# Patient Record
Sex: FEMALE | Race: WHITE | NOT HISPANIC OR LATINO | Employment: OTHER | URBAN - METROPOLITAN AREA
[De-identification: names, ages, dates, MRNs, and addresses within clinical notes are randomized per-mention and may not be internally consistent; named-entity substitution may affect disease eponyms.]

---

## 2019-04-05 ENCOUNTER — HISTORICAL (OUTPATIENT)
Dept: ADMINISTRATIVE | Facility: HOSPITAL | Age: 59
End: 2019-04-05

## 2019-12-12 ENCOUNTER — HISTORICAL (OUTPATIENT)
Dept: ADMINISTRATIVE | Facility: HOSPITAL | Age: 59
End: 2019-12-12

## 2021-08-12 LAB
HUMAN PAPILLOMAVIRUS (HPV): NORMAL
HUMAN PAPILLOMAVIRUS (HPV): NORMAL
PAP RECOMMENDATION EXT: NORMAL
PAP RECOMMENDATION EXT: NORMAL
PAP SMEAR: NORMAL
PAP SMEAR: NORMAL

## 2021-11-08 ENCOUNTER — HISTORICAL (OUTPATIENT)
Dept: ADMINISTRATIVE | Facility: HOSPITAL | Age: 61
End: 2021-11-08

## 2021-11-29 ENCOUNTER — HISTORICAL (OUTPATIENT)
Dept: ADMINISTRATIVE | Facility: HOSPITAL | Age: 61
End: 2021-11-29

## 2021-12-09 ENCOUNTER — HISTORICAL (OUTPATIENT)
Dept: ADMINISTRATIVE | Facility: HOSPITAL | Age: 61
End: 2021-12-09

## 2021-12-20 ENCOUNTER — HISTORICAL (OUTPATIENT)
Dept: ADMINISTRATIVE | Facility: HOSPITAL | Age: 61
End: 2021-12-20

## 2021-12-29 ENCOUNTER — HISTORICAL (OUTPATIENT)
Dept: ADMINISTRATIVE | Facility: HOSPITAL | Age: 61
End: 2021-12-29

## 2022-03-03 ENCOUNTER — HISTORICAL (OUTPATIENT)
Dept: ADMINISTRATIVE | Facility: HOSPITAL | Age: 62
End: 2022-03-03

## 2022-04-10 ENCOUNTER — HISTORICAL (OUTPATIENT)
Dept: ADMINISTRATIVE | Facility: HOSPITAL | Age: 62
End: 2022-04-10
Payer: COMMERCIAL

## 2022-04-25 VITALS
WEIGHT: 144.81 LBS | DIASTOLIC BLOOD PRESSURE: 80 MMHG | HEIGHT: 66 IN | BODY MASS INDEX: 23.27 KG/M2 | SYSTOLIC BLOOD PRESSURE: 116 MMHG

## 2022-12-29 ENCOUNTER — DOCUMENTATION ONLY (OUTPATIENT)
Dept: ADMINISTRATIVE | Facility: HOSPITAL | Age: 62
End: 2022-12-29
Payer: COMMERCIAL

## 2023-02-01 ENCOUNTER — DOCUMENTATION ONLY (OUTPATIENT)
Dept: ADMINISTRATIVE | Facility: HOSPITAL | Age: 63
End: 2023-02-01
Payer: COMMERCIAL

## 2023-02-24 ENCOUNTER — TELEPHONE (OUTPATIENT)
Dept: OBSTETRICS AND GYNECOLOGY | Facility: CLINIC | Age: 63
End: 2023-02-24
Payer: COMMERCIAL

## 2023-02-24 DIAGNOSIS — B37.31 CANDIDIASIS OF FEMALE GENITALIA: Primary | ICD-10-CM

## 2023-02-24 RX ORDER — CITALOPRAM 40 MG/1
40 TABLET, FILM COATED ORAL
COMMUNITY
Start: 2022-12-22

## 2023-02-24 RX ORDER — LOSARTAN POTASSIUM 50 MG/1
50 TABLET ORAL
COMMUNITY
Start: 2022-12-05

## 2023-02-24 RX ORDER — CONJUGATED ESTROGENS 0.62 MG/G
CREAM VAGINAL
COMMUNITY
Start: 2022-08-18

## 2023-02-24 RX ORDER — TERCONAZOLE 4 MG/G
1 CREAM VAGINAL NIGHTLY
Qty: 45 G | Refills: 0 | Status: SHIPPED | OUTPATIENT
Start: 2023-02-24 | End: 2023-03-03

## 2023-02-24 RX ORDER — AMLODIPINE BESYLATE 5 MG/1
5 TABLET ORAL
COMMUNITY
Start: 2022-12-22

## 2023-02-24 NOTE — TELEPHONE ENCOUNTER
Pt. C/o vulva/vaginal itching and irritation x 2-3 days.  Slight odor, but not fishy.  No discharge noted.  Terazol 7 ordered per HARSH Servin NP and sent to pharmacy ([facility]).  Instructions on medication usage given.  Stop Premarin cream for this coming week and restart once finished with Premarin.  If symptoms still persist, call office for appointment.  Patient voiced understanding and agrees with plan of care.

## 2023-08-30 ENCOUNTER — OFFICE VISIT (OUTPATIENT)
Dept: OBSTETRICS AND GYNECOLOGY | Facility: CLINIC | Age: 63
End: 2023-08-30
Payer: COMMERCIAL

## 2023-08-30 VITALS
DIASTOLIC BLOOD PRESSURE: 64 MMHG | WEIGHT: 145.63 LBS | HEIGHT: 65 IN | BODY MASS INDEX: 24.26 KG/M2 | SYSTOLIC BLOOD PRESSURE: 110 MMHG

## 2023-08-30 DIAGNOSIS — Z12.31 BREAST CANCER SCREENING BY MAMMOGRAM: ICD-10-CM

## 2023-08-30 DIAGNOSIS — Z01.411 ENCOUNTER FOR GYNECOLOGICAL EXAMINATION WITH ABNORMAL FINDING: Primary | ICD-10-CM

## 2023-08-30 DIAGNOSIS — N95.2 VAGINAL ATROPHY: ICD-10-CM

## 2023-08-30 PROCEDURE — 99396 PREV VISIT EST AGE 40-64: CPT | Mod: ,,, | Performed by: NURSE PRACTITIONER

## 2023-08-30 PROCEDURE — 4010F ACE/ARB THERAPY RXD/TAKEN: CPT | Mod: CPTII,,, | Performed by: NURSE PRACTITIONER

## 2023-08-30 PROCEDURE — 1159F MED LIST DOCD IN RCRD: CPT | Mod: CPTII,,, | Performed by: NURSE PRACTITIONER

## 2023-08-30 PROCEDURE — 1160F RVW MEDS BY RX/DR IN RCRD: CPT | Mod: CPTII,,, | Performed by: NURSE PRACTITIONER

## 2023-08-30 PROCEDURE — 3008F BODY MASS INDEX DOCD: CPT | Mod: CPTII,,, | Performed by: NURSE PRACTITIONER

## 2023-08-30 PROCEDURE — 4010F PR ACE/ARB THEARPY RXD/TAKEN: ICD-10-PCS | Mod: CPTII,,, | Performed by: NURSE PRACTITIONER

## 2023-08-30 PROCEDURE — 1160F PR REVIEW ALL MEDS BY PRESCRIBER/CLIN PHARMACIST DOCUMENTED: ICD-10-PCS | Mod: CPTII,,, | Performed by: NURSE PRACTITIONER

## 2023-08-30 PROCEDURE — 3078F PR MOST RECENT DIASTOLIC BLOOD PRESSURE < 80 MM HG: ICD-10-PCS | Mod: CPTII,,, | Performed by: NURSE PRACTITIONER

## 2023-08-30 PROCEDURE — 1159F PR MEDICATION LIST DOCUMENTED IN MEDICAL RECORD: ICD-10-PCS | Mod: CPTII,,, | Performed by: NURSE PRACTITIONER

## 2023-08-30 PROCEDURE — 3078F DIAST BP <80 MM HG: CPT | Mod: CPTII,,, | Performed by: NURSE PRACTITIONER

## 2023-08-30 PROCEDURE — 3008F PR BODY MASS INDEX (BMI) DOCUMENTED: ICD-10-PCS | Mod: CPTII,,, | Performed by: NURSE PRACTITIONER

## 2023-08-30 PROCEDURE — 3074F SYST BP LT 130 MM HG: CPT | Mod: CPTII,,, | Performed by: NURSE PRACTITIONER

## 2023-08-30 PROCEDURE — 99396 PR PREVENTIVE VISIT,EST,40-64: ICD-10-PCS | Mod: ,,, | Performed by: NURSE PRACTITIONER

## 2023-08-30 PROCEDURE — 3074F PR MOST RECENT SYSTOLIC BLOOD PRESSURE < 130 MM HG: ICD-10-PCS | Mod: CPTII,,, | Performed by: NURSE PRACTITIONER

## 2023-08-30 NOTE — PROGRESS NOTES
"Chief Complaint: Annual exam    Chief Complaint   Patient presents with    Well Woman       HPI:   Nurys Joe is a 62 y.o. year old  here for her Annual Exam. , denies vaginal bleeding.  , not sexually active x "years"  Using Premarin cream as directed, restarted 1 month ago.  Reports that vaginal dryness and discomfort "was bad"   Denies complaints today.     GYN HX:  MENOPAUSAL:  Age at menarche: 13  Age at menopause: 39  Hysterectomy:  No  Last pap: 21 WNL, Negative HPV  H/o abnl pap: No  Postcoital Bleeding: No  Sexually Active: not currently   Dyspareunia: No  H/o STI: No   HRT: vaginal premarin  MM19 WNL  H/o abnl MMG: No   Colonoscopy: 2019       Past Medical History:   Diagnosis Date    Anxiety     Hypertension      Past Surgical History:   Procedure Laterality Date    COLONOSCOPY  2019       Current Outpatient Medications:     amLODIPine (NORVASC) 5 MG tablet, Take 5 mg by mouth., Disp: , Rfl:     citalopram (CELEXA) 40 MG tablet, Take 40 mg by mouth., Disp: , Rfl:     conjugated estrogens (PREMARIN) vaginal cream,  See Instructions, 0.5gms vaginally q hs x 14 then 2-3x/week, # 30 gm, 6 Refill(s), [facility], 168, cm, Height/Length Dosing, 22 10:16:00 CDT, 65.6, kg, Weight Dosing, 22 10:15:00 CDT, Disp: , Rfl:     losartan (COZAAR) 50 MG tablet, Take 50 mg by mouth., Disp: , Rfl:     conjugated estrogens (PREMARIN) vaginal cream, Place 0.5 g vaginally once daily., Disp: 1 applicator, Rfl: 5  Review of patient's allergies indicates:   Allergen Reactions    Sulfamethoxazole      Other reaction(s): unknown reaction     OB History    Para Term  AB Living   2 2 2     2   SAB IAB Ectopic Multiple Live Births           2      # Outcome Date GA Lbr Maxi/2nd Weight Sex Delivery Anes PTL Lv   2 Term 92   2.948 kg (6 lb 8 oz) F Vag-Spont None  DRU   1 Term 10/15/85   2.948 kg (6 lb 8 oz) F Vag-Spont None  DRU " "    Social History     Tobacco Use    Smoking status: Never    Smokeless tobacco: Never   Substance Use Topics    Alcohol use: Yes    Drug use: Never     Family History   Problem Relation Age of Onset    Heart failure Father     Stroke Paternal Grandfather        Review of Systems:   Review of Systems   Constitutional:  Negative for appetite change, chills, fatigue, fever and unexpected weight change.   Eyes:  Negative for visual disturbance.   Respiratory:  Negative for cough, shortness of breath and wheezing.    Cardiovascular:  Negative for chest pain, palpitations and leg swelling.   Gastrointestinal:  Negative for abdominal pain, bloating, blood in stool, constipation, diarrhea, nausea, vomiting, reflux and fecal incontinence.   Endocrine: Negative for hair loss and hot flashes.   Genitourinary:  Negative for bladder incontinence, decreased libido, dysmenorrhea, dyspareunia, dysuria, flank pain, frequency, genital sores, hematuria, hot flashes, menorrhagia, menstrual problem, pelvic pain, urgency, vaginal bleeding, vaginal discharge, vaginal pain, urinary incontinence, postcoital bleeding, postmenopausal bleeding, vaginal dryness and vaginal odor.   Integumentary:  Negative for rash, acne, hair changes, breast mass, nipple discharge, breast skin changes and breast tenderness.   Neurological:  Negative for headaches.   Psychiatric/Behavioral:  Negative for depression.    Breast: Negative for asymmetry, breast self exam, lump, mass, mastodynia, nipple discharge, skin changes and tenderness       Physical Exam:  /64 (BP Location: Left arm, Patient Position: Sitting)   Ht 5' 5" (1.651 m)   Wt 66 kg (145 lb 9.6 oz)   BMI 24.23 kg/m²       Physical Exam:   Constitutional: She is oriented to person, place, and time. She appears well-developed and well-nourished.    HENT:   Head: Normocephalic.      Cardiovascular:       Exam reveals no edema.        Pulmonary/Chest: Effort normal. She exhibits no mass, no " tenderness, no bony tenderness, no deformity and no retraction. Right breast exhibits no inverted nipple, no mass, no nipple discharge, no skin change, no tenderness, no bleeding, no swelling, no mastectomy, no augmentation and no lumpectomy. Left breast exhibits no inverted nipple, no mass, no nipple discharge, no skin change, no tenderness, no bleeding, no swelling, no mastectomy, no augmentation and no lumpectomy. Breasts are symmetrical.        Abdominal: Soft. She exhibits no distension and no mass. There is no abdominal tenderness. There is no rebound and no guarding. No hernia. Hernia confirmed negative in the right inguinal area.     Genitourinary:    Inguinal canal, vagina, uterus, right adnexa, left adnexa and rectum normal.   Rectum:      No anal fissure or external hemorrhoid.   The external female genitalia was normal.   No external genitalia lesions identified,Genitalia hair distrobution normal .   Labial bartholins normal.There is no rash, tenderness, lesion or injury on the right labia. There is no rash, tenderness, lesion or injury on the left labia. Cervix is normal. No no masses or organomegaly. Right adnexum displays no mass, no tenderness and no fullness. Left adnexum displays no mass, no tenderness and no fullness. Vagina exhibits no lesion. No erythema,  no vaginal discharge, tenderness, bleeding, rectocele, cystocele or unspecified prolapse of vaginal walls in the vagina.    No foreign body in the vagina.      No signs of injury in the vagina.   Vagina was moist.Cervix exhibits no motion tenderness, no lesion, no discharge, no friability, no lesion, no tenderness and no polyp. Uterus consistancy normal. and Uerus contour normal  Uterus is not deviated, not enlarged, not fixed, not tender, not hosting fibroids and no uterine prolapse. Normal urethral meatus.Urethral Meatus exhibits: urethral lesion and prolapsedUrethra findings: no urethral mass and no tendernessBladder findings: no bladder  tenderness          Musculoskeletal: Normal range of motion.      Lymphadenopathy: No inguinal adenopathy noted on the right or left side.    Neurological: She is alert and oriented to person, place, and time.    Skin: Skin is warm and dry.    Psychiatric: She has a normal mood and affect. Her behavior is normal. Judgment and thought content normal.        Assessment:   Annual Well Women Exam  1. Encounter for gynecological examination with abnormal finding    2. Vaginal atrophy  - conjugated estrogens (PREMARIN) vaginal cream; Place 0.5 g vaginally once daily.  Dispense: 1 applicator; Refill: 5        Plan:    Breast Self-awareness  Recommend annual mammogram  Recommend exercise at least 3 times weekly  Healthy, balanced diet  Keep yearly follow up with PCP  Follow up for PRN/ANNUAL .   Nurys was seen today for well woman.    Diagnoses and all orders for this visit:    Encounter for gynecological examination with abnormal finding    Vaginal atrophy  -     conjugated estrogens (PREMARIN) vaginal cream; Place 0.5 g vaginally once daily.      Cont Premarin cream as directed     RTC PRN.ANNUAL     Counseling:    A brief discussion of STD prevention was had.    Avoidance of cigarette smoking, alcohol use, and drug use was encouraged.    A healthy diet and regular exercise was stressed.    All questions were answered and the patient voiced understanding of the above issues.

## 2023-12-18 DIAGNOSIS — D72.819 LEUKOPENIA: Primary | ICD-10-CM

## 2024-01-19 ENCOUNTER — HOSPITAL ENCOUNTER (INPATIENT)
Facility: HOSPITAL | Age: 64
LOS: 5 days | Discharge: HOME | DRG: 481 | End: 2024-01-24
Attending: STUDENT IN AN ORGANIZED HEALTH CARE EDUCATION/TRAINING PROGRAM | Admitting: STUDENT IN AN ORGANIZED HEALTH CARE EDUCATION/TRAINING PROGRAM
Payer: COMMERCIAL
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
## 2024-01-19 DIAGNOSIS — Z01.818 PRE-OP TESTING: ICD-10-CM

## 2024-01-19 DIAGNOSIS — S72.402A CLOSED FRACTURE OF DISTAL END OF LEFT FEMUR, UNSPECIFIED FRACTURE MORPHOLOGY, INITIAL ENCOUNTER: Primary | ICD-10-CM

## 2024-01-19 DIAGNOSIS — Z01.818 PRE-OP EVALUATION: ICD-10-CM

## 2024-01-19 LAB
ABORH RETYPE: NORMAL
ALBUMIN SERPL-MCNC: 4.3 G/DL (ref 3.4–4.8)
ALBUMIN/GLOB SERPL: 1.8 RATIO (ref 1.1–2)
ALP SERPL-CCNC: 63 UNIT/L (ref 40–150)
ALT SERPL-CCNC: 18 UNIT/L (ref 0–55)
AST SERPL-CCNC: 19 UNIT/L (ref 5–34)
BASOPHILS # BLD AUTO: 0.05 X10(3)/MCL
BASOPHILS NFR BLD AUTO: 0.4 %
BILIRUB SERPL-MCNC: 0.3 MG/DL
BUN SERPL-MCNC: 17.6 MG/DL (ref 9.8–20.1)
CALCIUM SERPL-MCNC: 9.8 MG/DL (ref 8.4–10.2)
CHLORIDE SERPL-SCNC: 105 MMOL/L (ref 98–107)
CO2 SERPL-SCNC: 25 MMOL/L (ref 23–31)
CREAT SERPL-MCNC: 1.12 MG/DL (ref 0.55–1.02)
EOSINOPHIL # BLD AUTO: 0.03 X10(3)/MCL (ref 0–0.9)
EOSINOPHIL NFR BLD AUTO: 0.2 %
ERYTHROCYTE [DISTWIDTH] IN BLOOD BY AUTOMATED COUNT: 12.3 % (ref 11.5–17)
GFR SERPLBLD CREATININE-BSD FMLA CKD-EPI: 55 MLS/MIN/1.73/M2
GLOBULIN SER-MCNC: 2.4 GM/DL (ref 2.4–3.5)
GLUCOSE SERPL-MCNC: 116 MG/DL (ref 82–115)
GROUP & RH: NORMAL
HCT VFR BLD AUTO: 37.8 % (ref 37–47)
HGB BLD-MCNC: 12.6 G/DL (ref 12–16)
IMM GRANULOCYTES # BLD AUTO: 0.02 X10(3)/MCL (ref 0–0.04)
IMM GRANULOCYTES NFR BLD AUTO: 0.2 %
INDIRECT COOMBS: NORMAL
INR PPP: 1
LYMPHOCYTES # BLD AUTO: 0.65 X10(3)/MCL (ref 0.6–4.6)
LYMPHOCYTES NFR BLD AUTO: 5.3 %
MCH RBC QN AUTO: 29.6 PG (ref 27–31)
MCHC RBC AUTO-ENTMCNC: 33.3 G/DL (ref 33–36)
MCV RBC AUTO: 88.7 FL (ref 80–94)
MONOCYTES # BLD AUTO: 0.3 X10(3)/MCL (ref 0.1–1.3)
MONOCYTES NFR BLD AUTO: 2.5 %
NEUTROPHILS # BLD AUTO: 11.12 X10(3)/MCL (ref 2.1–9.2)
NEUTROPHILS NFR BLD AUTO: 91.4 %
NRBC BLD AUTO-RTO: 0 %
PLATELET # BLD AUTO: 240 X10(3)/MCL (ref 130–400)
PMV BLD AUTO: 9.4 FL (ref 7.4–10.4)
POTASSIUM SERPL-SCNC: 4.4 MMOL/L (ref 3.5–5.1)
PROT SERPL-MCNC: 6.7 GM/DL (ref 5.8–7.6)
PROTHROMBIN TIME: 13.1 SECONDS (ref 12.5–14.5)
RBC # BLD AUTO: 4.26 X10(6)/MCL (ref 4.2–5.4)
SODIUM SERPL-SCNC: 140 MMOL/L (ref 136–145)
SPECIMEN OUTDATE: NORMAL
WBC # SPEC AUTO: 12.17 X10(3)/MCL (ref 4.5–11.5)

## 2024-01-19 PROCEDURE — 63600175 PHARM REV CODE 636 W HCPCS: Performed by: PHYSICIAN ASSISTANT

## 2024-01-19 PROCEDURE — 63600175 PHARM REV CODE 636 W HCPCS

## 2024-01-19 PROCEDURE — 93005 ELECTROCARDIOGRAM TRACING: CPT

## 2024-01-19 PROCEDURE — 11000001 HC ACUTE MED/SURG PRIVATE ROOM

## 2024-01-19 PROCEDURE — 25000003 PHARM REV CODE 250

## 2024-01-19 PROCEDURE — 99285 EMERGENCY DEPT VISIT HI MDM: CPT | Mod: 25

## 2024-01-19 PROCEDURE — 85610 PROTHROMBIN TIME: CPT | Performed by: STUDENT IN AN ORGANIZED HEALTH CARE EDUCATION/TRAINING PROGRAM

## 2024-01-19 PROCEDURE — 93010 ELECTROCARDIOGRAM REPORT: CPT | Mod: ,,, | Performed by: INTERNAL MEDICINE

## 2024-01-19 PROCEDURE — 86850 RBC ANTIBODY SCREEN: CPT | Performed by: ORTHOPAEDIC SURGERY

## 2024-01-19 PROCEDURE — 96374 THER/PROPH/DIAG INJ IV PUSH: CPT

## 2024-01-19 PROCEDURE — 80053 COMPREHEN METABOLIC PANEL: CPT | Performed by: STUDENT IN AN ORGANIZED HEALTH CARE EDUCATION/TRAINING PROGRAM

## 2024-01-19 PROCEDURE — 85025 COMPLETE CBC W/AUTO DIFF WBC: CPT | Performed by: STUDENT IN AN ORGANIZED HEALTH CARE EDUCATION/TRAINING PROGRAM

## 2024-01-19 PROCEDURE — 96375 TX/PRO/DX INJ NEW DRUG ADDON: CPT

## 2024-01-19 RX ORDER — METHOCARBAMOL 500 MG/1
500 TABLET, FILM COATED ORAL 3 TIMES DAILY
Status: DISCONTINUED | OUTPATIENT
Start: 2024-01-19 | End: 2024-01-24 | Stop reason: HOSPADM

## 2024-01-19 RX ORDER — TALC
6 POWDER (GRAM) TOPICAL NIGHTLY PRN
Status: DISCONTINUED | OUTPATIENT
Start: 2024-01-19 | End: 2024-01-24 | Stop reason: HOSPADM

## 2024-01-19 RX ORDER — POLYETHYLENE GLYCOL 3350 17 G/17G
17 POWDER, FOR SOLUTION ORAL DAILY
Status: DISCONTINUED | OUTPATIENT
Start: 2024-01-20 | End: 2024-01-22

## 2024-01-19 RX ORDER — CITALOPRAM 20 MG/1
20 TABLET, FILM COATED ORAL NIGHTLY
Status: DISCONTINUED | OUTPATIENT
Start: 2024-01-19 | End: 2024-01-24 | Stop reason: HOSPADM

## 2024-01-19 RX ORDER — ACETAMINOPHEN 325 MG/1
650 TABLET ORAL EVERY 8 HOURS PRN
Status: DISCONTINUED | OUTPATIENT
Start: 2024-01-19 | End: 2024-01-24 | Stop reason: HOSPADM

## 2024-01-19 RX ORDER — LOSARTAN POTASSIUM 50 MG/1
50 TABLET ORAL DAILY
Status: DISCONTINUED | OUTPATIENT
Start: 2024-01-20 | End: 2024-01-24 | Stop reason: HOSPADM

## 2024-01-19 RX ORDER — ACETAMINOPHEN 325 MG/1
650 TABLET ORAL EVERY 4 HOURS PRN
Status: DISCONTINUED | OUTPATIENT
Start: 2024-01-19 | End: 2024-01-24 | Stop reason: HOSPADM

## 2024-01-19 RX ORDER — ONDANSETRON 4 MG/1
8 TABLET, ORALLY DISINTEGRATING ORAL EVERY 8 HOURS PRN
Status: DISCONTINUED | OUTPATIENT
Start: 2024-01-19 | End: 2024-01-20

## 2024-01-19 RX ORDER — ENOXAPARIN SODIUM 100 MG/ML
40 INJECTION SUBCUTANEOUS EVERY 12 HOURS
Status: DISCONTINUED | OUTPATIENT
Start: 2024-01-19 | End: 2024-01-24 | Stop reason: HOSPADM

## 2024-01-19 RX ORDER — AMLODIPINE BESYLATE 5 MG/1
5 TABLET ORAL DAILY
Status: DISCONTINUED | OUTPATIENT
Start: 2024-01-20 | End: 2024-01-24 | Stop reason: HOSPADM

## 2024-01-19 RX ORDER — HYDROMORPHONE HYDROCHLORIDE 2 MG/ML
0.5 INJECTION, SOLUTION INTRAMUSCULAR; INTRAVENOUS; SUBCUTANEOUS EVERY 6 HOURS PRN
Status: DISCONTINUED | OUTPATIENT
Start: 2024-01-19 | End: 2024-01-20

## 2024-01-19 RX ORDER — OXYCODONE HYDROCHLORIDE 5 MG/1
5 TABLET ORAL EVERY 4 HOURS PRN
Status: DISCONTINUED | OUTPATIENT
Start: 2024-01-19 | End: 2024-01-24 | Stop reason: HOSPADM

## 2024-01-19 RX ORDER — MORPHINE SULFATE 4 MG/ML
4 INJECTION, SOLUTION INTRAMUSCULAR; INTRAVENOUS
Status: COMPLETED | OUTPATIENT
Start: 2024-01-19 | End: 2024-01-19

## 2024-01-19 RX ORDER — NALOXONE HCL 0.4 MG/ML
0.4 VIAL (ML) INJECTION
Status: DISCONTINUED | OUTPATIENT
Start: 2024-01-19 | End: 2024-01-24 | Stop reason: HOSPADM

## 2024-01-19 RX ORDER — ONDANSETRON HYDROCHLORIDE 2 MG/ML
4 INJECTION, SOLUTION INTRAVENOUS
Status: COMPLETED | OUTPATIENT
Start: 2024-01-19 | End: 2024-01-19

## 2024-01-19 RX ORDER — SODIUM CHLORIDE 0.9 % (FLUSH) 0.9 %
10 SYRINGE (ML) INJECTION
Status: DISCONTINUED | OUTPATIENT
Start: 2024-01-19 | End: 2024-01-24 | Stop reason: HOSPADM

## 2024-01-19 RX ORDER — SODIUM CHLORIDE, SODIUM LACTATE, POTASSIUM CHLORIDE, CALCIUM CHLORIDE 600; 310; 30; 20 MG/100ML; MG/100ML; MG/100ML; MG/100ML
INJECTION, SOLUTION INTRAVENOUS CONTINUOUS
Status: ACTIVE | OUTPATIENT
Start: 2024-01-20 | End: 2024-01-20

## 2024-01-19 RX ORDER — OXYCODONE HYDROCHLORIDE 10 MG/1
10 TABLET ORAL EVERY 4 HOURS PRN
Status: DISCONTINUED | OUTPATIENT
Start: 2024-01-19 | End: 2024-01-24 | Stop reason: HOSPADM

## 2024-01-19 RX ORDER — LIDOCAINE HYDROCHLORIDE 10 MG/ML
1 INJECTION INFILTRATION; PERINEURAL ONCE AS NEEDED
Status: DISCONTINUED | OUTPATIENT
Start: 2024-01-19 | End: 2024-01-24 | Stop reason: HOSPADM

## 2024-01-19 RX ADMIN — ENOXAPARIN SODIUM 40 MG: 100 INJECTION SUBCUTANEOUS at 08:01

## 2024-01-19 RX ADMIN — ONDANSETRON 4 MG: 2 INJECTION INTRAMUSCULAR; INTRAVENOUS at 03:01

## 2024-01-19 RX ADMIN — MORPHINE SULFATE 4 MG: 4 INJECTION, SOLUTION INTRAMUSCULAR; INTRAVENOUS at 03:01

## 2024-01-19 RX ADMIN — METHOCARBAMOL 500 MG: 500 TABLET ORAL at 08:01

## 2024-01-19 RX ADMIN — CITALOPRAM HYDROBROMIDE 20 MG: 20 TABLET ORAL at 08:01

## 2024-01-19 NOTE — H&P
Trauma Surgery   History and Physical Note    Patient Name: Nurys Joe  YOB: 1960  Date: 01/19/2024 4:44 PM  Date of Admission: 1/19/2024  HD#0  POD#* No surgery date entered *    PRESENTING HISTORY   Chief Complaint/Reason for Admission: <principal problem not specified>    History of Present Illness:  63F PMH HTN here after slipping and falling down stairs, about 3-4 feet, and landing on her L knee. Denies hitting her head. Complains of L knee pain with movement and palpation. Found to have distal femur fx. Able to wiggle toes. 2+ DP..    Review of Systems:  12 point ROS negative except as stated in HPI    PAST HISTORY:   Past medical history:  Past Medical History:   Diagnosis Date    Anxiety     Hypertension        Past surgical history:  Past Surgical History:   Procedure Laterality Date    COLONOSCOPY  04/2019       Family history:  Family History   Problem Relation Age of Onset    Heart failure Father     Stroke Paternal Grandfather        Social history:  Social History     Socioeconomic History    Marital status:    Tobacco Use    Smoking status: Never    Smokeless tobacco: Never   Substance and Sexual Activity    Alcohol use: Yes    Drug use: Never    Sexual activity: Not Currently     Birth control/protection: Post-menopausal, Other-see comments     Social History     Tobacco Use   Smoking Status Never   Smokeless Tobacco Never      Social History     Substance and Sexual Activity   Alcohol Use Yes        MEDICATIONS & ALLERGIES:   Allergies:   Review of patient's allergies indicates:   Allergen Reactions    Sulfamethoxazole      Other reaction(s): unknown reaction     Home Meds:   Current Outpatient Medications   Medication Instructions    amLODIPine (NORVASC) 5 mg, Oral    citalopram (CELEXA) 40 mg, Oral    conjugated estrogens (PREMARIN) vaginal cream   See Instructions, 0.5gms vaginally q hs x 14 then 2-3x/week, # 30 gm, 6 Refill(s), Pharmacy: [facility]  [facility] Mel[facility], 168, cm, Height/Length Dosing, 08/18/22 10:16:00 CDT, 65.6, kg, Weight Dosing, 08/18/22 10:15:00 CDT    conjugated estrogens (PREMARIN) 0.5 g, Vaginal, Daily    losartan (COZAAR) 50 mg, Oral      No current facility-administered medications on file prior to encounter.     Current Outpatient Medications on File Prior to Encounter   Medication Sig Dispense Refill    amLODIPine (NORVASC) 5 MG tablet Take 5 mg by mouth.      citalopram (CELEXA) 40 MG tablet Take 40 mg by mouth.      conjugated estrogens (PREMARIN) vaginal cream   See Instructions, 0.5gms vaginally q hs x 14 then 2-3x/week, # 30 gm, 6 Refill(s), Pharmacy: [facility]- [facility] Melendez, 168, cm, Height/Length Dosing, 08/18/22 10:16:00 CDT, 65.6, kg, Weight Dosing, 08/18/22 10:15:00 CDT      conjugated estrogens (PREMARIN) vaginal cream Place 0.5 g vaginally once daily. 1 applicator 5    losartan (COZAAR) 50 MG tablet Take 50 mg by mouth.        No current facility-administered medications on file prior to encounter.     Current Outpatient Medications on File Prior to Encounter   Medication Sig    amLODIPine (NORVASC) 5 MG tablet Take 5 mg by mouth.    citalopram (CELEXA) 40 MG tablet Take 40 mg by mouth.    conjugated estrogens (PREMARIN) vaginal cream   See Instructions, 0.5gms vaginally q hs x 14 then 2-3x/week, # 30 gm, 6 Refill(s), Pharmacy: [facility]- [facility] Mel[facility], 168, cm, Height/Length Dosing, 08/18/22 10:16:00 CDT, 65.6, kg, Weight Dosing, 08/18/22 10:15:00 CDT    conjugated estrogens (PREMARIN) vaginal cream Place 0.5 g vaginally once daily.    losartan (COZAAR) 50 MG tablet Take 50 mg by mouth.     Scheduled Meds:  Continuous Infusions:  PRN Meds:    OBJECTIVE:   Vital Signs:  VITAL SIGNS: 24 HR MIN & MAX LAST   Temp  Min: 99.3 °F (37.4 °C)  Max: 99.3 °F (37.4 °C)  99.3 °F (37.4 °C)   BP  Min: 115/74  Max: 132/91  (!) 132/91    Pulse  Min: 82  Max: 85  82    Resp  Min: 17  Max: 18  18    SpO2  Min: 100 %  Max: 100 %  100 %   "    HT: 5' 6" (167.6 cm)  WT: 65.8 kg (145 lb)  BMI: 23.4   Intake/output: No intake/output data recorded.     Lines/drains/airway:       Peripheral IV - Single Lumen 01/19/24 1530 20 G Right Antecubital (Active)   Number of days: 0       Physical Exam:  General:  Well developed, well nourished, no acute distress  HEENT:  Normocephalic, atraumatic  CV:  RR, 2+ DPs bilaterally  Resp/chest: NWOB  GI:  Abdomen soft, non-tender, non-distended  :  Deferred  MSK:  No muscle atrophy, cyanosis, peripheral edema, moving all extremities spontaneously  Neuro: GCS 15. CNII-XII grossly intact, alert and oriented to person, place, and time. Strength and motor function grossly intact to all extremities, sensation intact to all extremities.  Skin/Wounds:  LLE swelling, tender to palpation, painful with passive movement. 2+ DP, sensation intact.    Labs:  Troponin:  No results for input(s): "TROPONINI" in the last 72 hours.  CBC:  No results for input(s): "WBC", "RBC", "HGB", "HCT", "PLT", "MCV", "MCH", "MCHC" in the last 72 hours.  CMP:  No results for input(s): "GLU", "CALCIUM", "ALBUMIN", "PROT", "NA", "K", "CO2", "CL", "BUN", "CREATININE", "ALKPHOS", "ALT", "AST", "BILITOT" in the last 72 hours.  Lactic Acid:  No results for input(s): "LACTATE" in the last 72 hours.  ETOH:  No results for input(s): "ETHANOL" in the last 72 hours.   Urine Drug Screen:  No results for input(s): "COCAINE", "OPIATE", "BARBITURATE", "AMPHETAMINE", "FENTANYL", "CANNABINOIDS", "MDMA" in the last 72 hours.    Invalid input(s): "BENZODIAZEPINE", "PHENCYCLIDINE"   ABG  No results for input(s): "PH", "PO2", "PCO2", "HCO3", "BE" in the last 168 hours.      Diagnostic Results:  X-Ray Hip 2 or 3 views Left (with Pelvis when performed)   Final Result      No acute osseous abnormality identified.         Electronically signed by: Robbin Kirkland   Date:    01/19/2024   Time:    16:25      X-Ray Knee Complete 4 or More Views Left   Final Result      Fracture " distal femur.         Electronically signed by: Robbin Kirkland   Date:    01/19/2024   Time:    16:24      X-Ray Chest AP Portable    (Results Pending)   CT Hip Without Contrast Left    (Results Pending)   CT Knee Without Contrast Left    (Results Pending)       ASSESSMENT & PLAN:    63F here after a fall down stairs and landing on her L knee. Found to have distal femur fx    Pain  -MMPC    L femur x  -Ortho following   -OR 1/20   -NPO at midnight   -NWB LLE  HTN  -restart home HTN meds    Ppx: SCDs    Dispo: pending ortho intervention    Michael Marie MD  [employer] General Surgery, PGY-1  01/19/2024

## 2024-01-19 NOTE — Clinical Note
Diagnosis: Fall [967349]   Future Attending Provider: SILVIA SHEIKH [[id number]]   Admit to which facility:: [facility] [[id number]]   Reason for IP Medical Treatment  (Clinical interventions that can only be accomplished in the IP setting? ) :: Ortho   I certify that Inpatient services for greater than or equal to 2 midnights are medically necessary:: Yes   Plans for Post-Acute care--if anticipated (pick the single best option):: A. No post acute care anticipated at this time

## 2024-01-19 NOTE — FIRST PROVIDER EVALUATION
Medical screening examination initiated.  I have conducted a focused provider triage encounter, findings are as follows:    Chief Complaint   Patient presents with    Fall     Trip and fell down x4 stairs landing on brick ean onto her L. Hip/knee. (-) BT, (-) head trauma, (-)LOC. LLE internally rotated, 2+ DP, swelling noted to knee, no bruising/open wound.      Brief history of present illness:  63 y.o. female presents to the ED with left hip and left knee pain s/t trip and fall down 4 steps. Denies hitting her head or LOC. unable to straighten the leg or bear weight since the fall    There were no vitals filed for this visit.    Pertinent physical exam:  Awake, alert, non-labored respirations, 2+ DP pulse, LLE appears internally rotated    Brief workup plan:  imaging     Preliminary workup initiated; this workup will be continued and followed by the physician or advanced practice provider that is assigned to the patient when roomed.

## 2024-01-19 NOTE — PROGRESS NOTES
Pt has a left distal femur fracture CT pending. And Left proximal femur fracture CT pending. These are operative. CT for surgical planning and implant choice.     OR tomorrow  NPO midnight  EKG  NWB    Yariel

## 2024-01-19 NOTE — ED PROVIDER NOTES
Encounter Date: 1/19/2024    SCRIBE #1 NOTE: I, Clara Solis, am scribing for, and in the presence of,  Martinez Ashton MD. I have scribed the entire note.       History     Chief Complaint   Patient presents with    Fall     Trip and fell down x4 stairs landing on brick ean onto her L. Hip/knee. (-) BT, (-) head trauma, (-)LOC. LLE internally rotated, 2+ DP, swelling noted to knee, no bruising/open wound.      63 year old female with a hx of HTN and anxiety presents to the ED following a fall. Pt states she slipped and fell while walking down the stairs in her house today. Pt denies hitting her head or any LOC. Pt landed on her left side. Pt complains of pain to her left hip and left knee. Pt was not able to ambulate after the fall. Pt is not on a blood thinner.     The history is provided by the patient. No  was used.     Review of patient's allergies indicates:   Allergen Reactions    Sulfamethoxazole      Other reaction(s): unknown reaction     Past Medical History:   Diagnosis Date    Anxiety     Hypertension      Past Surgical History:   Procedure Laterality Date    COLONOSCOPY  04/2019     Family History   Problem Relation Age of Onset    Heart failure Father     Stroke Paternal Grandfather      Social History     Tobacco Use    Smoking status: Never    Smokeless tobacco: Never   Substance Use Topics    Alcohol use: Yes    Drug use: Never     Review of Systems   Musculoskeletal:  Negative for neck pain.        Left hip pain and left knee pain.    Neurological:  Negative for headaches.       Physical Exam     Initial Vitals [01/19/24 1522]   BP Pulse Resp Temp SpO2   115/74 85 17 99.3 °F (37.4 °C) 100 %      MAP       --         Physical Exam    Nursing note and vitals reviewed.  Constitutional: She appears well-developed and well-nourished. She is not diaphoretic. No distress.   HENT:   Head: Normocephalic and atraumatic.   Right Ear: External ear normal.   Left Ear: External ear  normal.   Nose: Nose normal.   Eyes: Conjunctivae and EOM are normal. Pupils are equal, round, and reactive to light. Right eye exhibits no discharge. Left eye exhibits no discharge.   Cardiovascular:  Normal rate, regular rhythm, normal heart sounds and intact distal pulses.     Exam reveals no gallop and no friction rub.       No murmur heard.  Pulses:       Radial pulses are 2+ on the right side and 2+ on the left side.        Dorsalis pedis pulses are 2+ on the right side and 2+ on the left side.   Pulmonary/Chest: Breath sounds normal. No respiratory distress. She has no wheezes. She has no rhonchi. She has no rales. She exhibits no tenderness.   Abdominal: Abdomen is soft. Bowel sounds are normal. She exhibits no distension and no mass. There is no abdominal tenderness. There is no rebound and no guarding.   Musculoskeletal:         General: No edema. Normal range of motion.      Comments: Swelling and tenderness around the distal left femur.      Neurological: She is alert and oriented to person, place, and time. No cranial nerve deficit or sensory deficit. GCS eye subscore is 4. GCS verbal subscore is 5. GCS motor subscore is 6.   Skin: Skin is warm and dry. Capillary refill takes less than 2 seconds. No erythema. No pallor.         ED Course   Procedures  Labs Reviewed   CBC W/ AUTO DIFFERENTIAL    Narrative:     The following orders were created for panel order CBC auto differential.  Procedure                               Abnormality         Status                     ---------                               -----------         ------                     CBC with Differential[[id number]]                           In process                   Please view results for these tests on the individual orders.   COMPREHENSIVE METABOLIC PANEL   PROTIME-INR   CBC WITH DIFFERENTIAL   TYPE & SCREEN          Imaging Results              CT Hip Without Contrast Left (Final result)  Result time 01/19/24 17:11:39       Final result by Juan Keys MD (01/19/24 17:11:39)                   Impression:      No evidence of acute fracture    Likely old avulsion injury at the tip of the greater trochanter      Electronically signed by: Len Kyes  Date:    01/19/2024  Time:    17:11               Narrative:    EXAMINATION:  CT HIP WITHOUT CONTRAST LEFT    CLINICAL HISTORY:  Hip trauma, fracture suspected, xray done;    TECHNIQUE:  Multiple axial images were obtained of the left hip and sagittal and coronal reconstructions were performed.  Automatic exposure control (AEC) is utilized to reduce patient radiation exposure.    COMPARISON:  Plain film dated 01/19/2024    FINDINGS:  The bones and joints are in good anatomic alignment. There is a chronic appearing avulsion type injury at the tip of the greater trochanter. No convincing evidence of acute fracture is seen.  No dislocation is seen.  No soft tissue abnormality seen. No hematoma is seen. No soft tissue swelling seen.                                       CT Knee Without Contrast Left (Final result)  Result time 01/19/24 17:19:39      Final result by Juan Keys MD (01/19/24 17:19:39)                   Impression:      Fracture of the distal femur as outlined above      Electronically signed by: Len Keys  Date:    01/19/2024  Time:    17:19               Narrative:    EXAMINATION:  CT KNEE WITHOUT CONTRAST LEFT    CLINICAL HISTORY:  Fracture, knee;    TECHNIQUE:  Multiple axial images were obtained of the left knee and sagittal and coronal reconstructions were performed.    COMPARISON:  Plain film dated 01/19/2024    FINDINGS:  There is an obliquely oriented fracture of the distal femoral metaphysis.  The fracture extends inferiorly into the intracondylar region.  There is some displacement seen.  The fracture terminates at the lateral femoral condyle at the level the condylar notch.  No tibia fracture is seen.  No fibula fracture is seen.  The patella  is intact.  There is a suprapatellar bursa effusion.                                       X-Ray Chest AP Portable (Final result)  Result time 01/19/24 16:43:58      Final result by Vincent Ruby MD (01/19/24 16:43:58)                   Impression:      No acute pulmonary process identified.      Electronically signed by: Vincent Ruby  Date:    01/19/2024  Time:    16:43               Narrative:    EXAMINATION:  XR CHEST AP PORTABLE    CLINICAL HISTORY:  pre-op;    TECHNIQUE:  Frontal view(s) of the chest.    COMPARISON:  No relevant comparison studies available at the time of dictation.    FINDINGS:  Normal cardiac silhouette.  The lungs are well-inflated.  No consolidation identified.  No significant pleural effusion or discernible pneumothorax.                                       X-Ray Hip 2 or 3 views Left (with Pelvis when performed) (Final result)  Result time 01/19/24 16:25:38      Final result by Robbin Kirkland MD (01/19/24 16:25:38)                   Impression:      No acute osseous abnormality identified.      Electronically signed by: Robbin Kirkland  Date:    01/19/2024  Time:    16:25               Narrative:    EXAMINATION:  XR HIP WITH PELVIS WHEN PERFORMED, 2 OR 3 VIEWS LEFT    CLINICAL HISTORY:  Four.    TECHNIQUE:  Three views    COMPARISON:  None available    FINDINGS:  Articular surfaces are unremarkable and there is no intrinsic osseous abnormality.  No acute or dislocation.  There are mild degenerative arthritic changes.  There is calcification adjacent to the greater trochanter.                                       X-Ray Knee Complete 4 or More Views Left (Final result)  Result time 01/19/24 16:24:20      Final result by Robbin Kirkland MD (01/19/24 16:24:20)                   Impression:      Fracture distal femur.      Electronically signed by: Robbin Kirkland  Date:    01/19/2024  Time:    16:24               Narrative:    EXAMINATION:  XR KNEE COMP 4 OR MORE VIEWS LEFT    CLINICAL  HISTORY:  Unspecified fall, initial encounter    TECHNIQUE:  Two-view    COMPARISON:  None available    FINDINGS:  There is fracture supracondylar distal femur with mild displacement better visualized on the lateral projection.  No acute fracture or dislocation about the left knee joint identified                                       Medications   LIDOcaine HCL 10 mg/ml (1%) injection 1 mL (has no administration in time range)   sodium chloride 0.9% flush 10 mL (has no administration in time range)   ondansetron disintegrating tablet 8 mg (has no administration in time range)   melatonin tablet 6 mg (has no administration in time range)   acetaminophen tablet 650 mg (has no administration in time range)   acetaminophen tablet 650 mg (has no administration in time range)   oxyCODONE immediate release tablet 5 mg (has no administration in time range)   oxyCODONE immediate release tablet Tab 10 mg (has no administration in time range)   lactated ringers infusion (has no administration in time range)   polyethylene glycol packet 17 g (has no administration in time range)   methocarbamoL tablet 500 mg (has no administration in time range)   HYDROmorphone (PF) injection 0.5 mg (has no administration in time range)   naloxone 0.4 mg/mL injection 0.4 mg (has no administration in time range)   morphine injection 4 mg (4 mg Intravenous Given 1/19/24 1530)   ondansetron injection 4 mg (4 mg Intravenous Given 1/19/24 1530)     Medical Decision Making        The differential diagnosis includes, but is not limited to, fracture laceration contusion sprain      Patient awake alert well-appearing.  Resting comfortably.  NAD.  She reports she does not have any pain as long she does not move her left leg.  Swelling tenderness palpation over left leg and lateral left hip.  Plain films reveal a distal femur fracture of the left.  Possible chip fracture off the greater trochanter on the left however this may be a calcification.   Discussed with Orthopedic surgery.  Recommends CT scan for preop planning.  We will get whenever the hip pain the knee.  Will admit to Trauma surgery.  He was spoke with trauma team.  Happy to admit.  Discussed with the patient.  She was comfortable with plan.  Care transferred.      Amount and/or Complexity of Data Reviewed  External Data Reviewed: notes.     Details: Seen for proximal humerus fracture in the past.  Labs: ordered.  Radiology: ordered. Decision-making details documented in ED Course.    Risk  Decision regarding hospitalization.            Scribe Attestation:   Scribe #1: I performed the above scribed service and the documentation accurately describes the services I performed. I attest to the accuracy of the note.    Attending Attestation:           Physician Attestation for Scribe:  Physician Attestation Statement for Scribe #1: I, Martinez Ashton MD, reviewed documentation, as scribed by Clara Solis in my presence, and it is both accurate and complete.             ED Course as of 01/19/24 1726   Fri Jan 19, 2024   1633 Spoke to Dr Garduno, trauma surgery resident.  Will admit. [MM]   1636 EKG from 1628 shows sinus rhythm rate of 71 .  Normal axis.  Inverted T-waves in lead 3 AVF V3 V4.  No obvious ST elevation or depression.   [MM]   1639 X-Ray Knee Complete 4 or More Views Left  Distal femur fracture [MM]      ED Course User Index  [MM] Martinez Ashton MD                           Clinical Impression:  Final diagnoses:  [W19.XXXA] Fall  [Z01.818] Pre-op testing  [S72.402A] Closed fracture of distal end of left femur, unspecified fracture morphology, initial encounter (Primary)          ED Disposition Condition    Admit Stable                Martinez Ashton MD  01/19/24 1726

## 2024-01-20 ENCOUNTER — ANESTHESIA EVENT (OUTPATIENT)
Dept: SURGERY | Facility: HOSPITAL | Age: 64
DRG: 481 | End: 2024-01-20
Payer: COMMERCIAL

## 2024-01-20 ENCOUNTER — ANESTHESIA (OUTPATIENT)
Dept: SURGERY | Facility: HOSPITAL | Age: 64
DRG: 481 | End: 2024-01-20
Payer: COMMERCIAL

## 2024-01-20 PROBLEM — S72.402A CLOSED FRACTURE OF LEFT DISTAL FEMUR: Status: ACTIVE | Noted: 2024-01-20

## 2024-01-20 PROBLEM — S72.115P: Status: ACTIVE | Noted: 2024-01-20

## 2024-01-20 LAB
ANION GAP SERPL CALC-SCNC: 7 MEQ/L
BASOPHILS # BLD AUTO: 0.04 X10(3)/MCL
BASOPHILS NFR BLD AUTO: 0.5 %
BUN SERPL-MCNC: 15.5 MG/DL (ref 9.8–20.1)
CALCIUM SERPL-MCNC: 8.9 MG/DL (ref 8.4–10.2)
CHLORIDE SERPL-SCNC: 104 MMOL/L (ref 98–107)
CO2 SERPL-SCNC: 28 MMOL/L (ref 23–31)
CREAT SERPL-MCNC: 0.84 MG/DL (ref 0.55–1.02)
CREAT/UREA NIT SERPL: 18
EOSINOPHIL # BLD AUTO: 0.09 X10(3)/MCL (ref 0–0.9)
EOSINOPHIL NFR BLD AUTO: 1.1 %
ERYTHROCYTE [DISTWIDTH] IN BLOOD BY AUTOMATED COUNT: 12.4 % (ref 11.5–17)
GFR SERPLBLD CREATININE-BSD FMLA CKD-EPI: >60 MLS/MIN/1.73/M2
GLUCOSE SERPL-MCNC: 121 MG/DL (ref 82–115)
HCT VFR BLD AUTO: 35.9 % (ref 37–47)
HGB BLD-MCNC: 11.7 G/DL (ref 12–16)
IMM GRANULOCYTES # BLD AUTO: 0.03 X10(3)/MCL (ref 0–0.04)
IMM GRANULOCYTES NFR BLD AUTO: 0.4 %
LYMPHOCYTES # BLD AUTO: 1.05 X10(3)/MCL (ref 0.6–4.6)
LYMPHOCYTES NFR BLD AUTO: 13 %
MAGNESIUM SERPL-MCNC: 2 MG/DL (ref 1.6–2.6)
MCH RBC QN AUTO: 29 PG (ref 27–31)
MCHC RBC AUTO-ENTMCNC: 32.6 G/DL (ref 33–36)
MCV RBC AUTO: 89.1 FL (ref 80–94)
MONOCYTES # BLD AUTO: 0.51 X10(3)/MCL (ref 0.1–1.3)
MONOCYTES NFR BLD AUTO: 6.3 %
NEUTROPHILS # BLD AUTO: 6.33 X10(3)/MCL (ref 2.1–9.2)
NEUTROPHILS NFR BLD AUTO: 78.7 %
NRBC BLD AUTO-RTO: 0 %
PHOSPHATE SERPL-MCNC: 4 MG/DL (ref 2.3–4.7)
PLATELET # BLD AUTO: 203 X10(3)/MCL (ref 130–400)
PMV BLD AUTO: 9.7 FL (ref 7.4–10.4)
POTASSIUM SERPL-SCNC: 4.6 MMOL/L (ref 3.5–5.1)
RBC # BLD AUTO: 4.03 X10(6)/MCL (ref 4.2–5.4)
SODIUM SERPL-SCNC: 139 MMOL/L (ref 136–145)
WBC # SPEC AUTO: 8.05 X10(3)/MCL (ref 4.5–11.5)

## 2024-01-20 PROCEDURE — C1713 ANCHOR/SCREW BN/BN,TIS/BN: HCPCS | Performed by: ORTHOPAEDIC SURGERY

## 2024-01-20 PROCEDURE — 25000003 PHARM REV CODE 250: Performed by: ANESTHESIOLOGY

## 2024-01-20 PROCEDURE — 63600175 PHARM REV CODE 636 W HCPCS: Performed by: ANESTHESIOLOGY

## 2024-01-20 PROCEDURE — 63600175 PHARM REV CODE 636 W HCPCS: Performed by: NURSE ANESTHETIST, CERTIFIED REGISTERED

## 2024-01-20 PROCEDURE — 0QSC06Z REPOSITION LEFT LOWER FEMUR WITH INTRAMEDULLARY INTERNAL FIXATION DEVICE, OPEN APPROACH: ICD-10-PCS | Performed by: ORTHOPAEDIC SURGERY

## 2024-01-20 PROCEDURE — 85025 COMPLETE CBC W/AUTO DIFF WBC: CPT

## 2024-01-20 PROCEDURE — 97162 PT EVAL MOD COMPLEX 30 MIN: CPT

## 2024-01-20 PROCEDURE — 71000033 HC RECOVERY, INTIAL HOUR: Performed by: ORTHOPAEDIC SURGERY

## 2024-01-20 PROCEDURE — 63600175 PHARM REV CODE 636 W HCPCS

## 2024-01-20 PROCEDURE — 80048 BASIC METABOLIC PNL TOTAL CA: CPT

## 2024-01-20 PROCEDURE — 27513 TREATMENT OF THIGH FRACTURE: CPT | Mod: AS,LT,, | Performed by: PHYSICIAN ASSISTANT

## 2024-01-20 PROCEDURE — 63600175 PHARM REV CODE 636 W HCPCS: Performed by: ORTHOPAEDIC SURGERY

## 2024-01-20 PROCEDURE — 99223 1ST HOSP IP/OBS HIGH 75: CPT | Mod: 57,,, | Performed by: ORTHOPAEDIC SURGERY

## 2024-01-20 PROCEDURE — 37000008 HC ANESTHESIA 1ST 15 MINUTES: Performed by: ORTHOPAEDIC SURGERY

## 2024-01-20 PROCEDURE — 25000003 PHARM REV CODE 250

## 2024-01-20 PROCEDURE — 25000003 PHARM REV CODE 250: Performed by: NURSE ANESTHETIST, CERTIFIED REGISTERED

## 2024-01-20 PROCEDURE — 0QSC04Z REPOSITION LEFT LOWER FEMUR WITH INTERNAL FIXATION DEVICE, OPEN APPROACH: ICD-10-PCS | Performed by: ORTHOPAEDIC SURGERY

## 2024-01-20 PROCEDURE — C1769 GUIDE WIRE: HCPCS | Performed by: ORTHOPAEDIC SURGERY

## 2024-01-20 PROCEDURE — 83735 ASSAY OF MAGNESIUM: CPT

## 2024-01-20 PROCEDURE — 37000009 HC ANESTHESIA EA ADD 15 MINS: Performed by: ORTHOPAEDIC SURGERY

## 2024-01-20 PROCEDURE — 36000711: Performed by: ORTHOPAEDIC SURGERY

## 2024-01-20 PROCEDURE — D9220A PRA ANESTHESIA: Mod: ANES,,, | Performed by: ANESTHESIOLOGY

## 2024-01-20 PROCEDURE — 84100 ASSAY OF PHOSPHORUS: CPT

## 2024-01-20 PROCEDURE — 27201423 OPTIME MED/SURG SUP & DEVICES STERILE SUPPLY: Performed by: ORTHOPAEDIC SURGERY

## 2024-01-20 PROCEDURE — 36000710: Performed by: ORTHOPAEDIC SURGERY

## 2024-01-20 PROCEDURE — 63600175 PHARM REV CODE 636 W HCPCS: Performed by: PHYSICIAN ASSISTANT

## 2024-01-20 PROCEDURE — 30233N1 TRANSFUSION OF NONAUTOLOGOUS RED BLOOD CELLS INTO PERIPHERAL VEIN, PERCUTANEOUS APPROACH: ICD-10-PCS | Performed by: ORTHOPAEDIC SURGERY

## 2024-01-20 PROCEDURE — 27513 TREATMENT OF THIGH FRACTURE: CPT | Mod: LT,,, | Performed by: ORTHOPAEDIC SURGERY

## 2024-01-20 PROCEDURE — D9220A PRA ANESTHESIA: Mod: CRNA,,, | Performed by: NURSE ANESTHETIST, CERTIFIED REGISTERED

## 2024-01-20 PROCEDURE — 11000001 HC ACUTE MED/SURG PRIVATE ROOM

## 2024-01-20 DEVICE — IMPLANTABLE DEVICE: Type: IMPLANTABLE DEVICE | Site: FEMUR | Status: FUNCTIONAL

## 2024-01-20 DEVICE — WIRE GUIDE 3.2MM 400MM: Type: IMPLANTABLE DEVICE | Site: FEMUR | Status: FUNCTIONAL

## 2024-01-20 DEVICE — SCREW OPTILINK T25 5.0X80MM: Type: IMPLANTABLE DEVICE | Site: FEMUR | Status: FUNCTIONAL

## 2024-01-20 DEVICE — SCREW STRDRV VA LOK 3.5X70MM: Type: IMPLANTABLE DEVICE | Site: FEMUR | Status: FUNCTIONAL

## 2024-01-20 DEVICE — SCREW XL25 IM NAIL 36X5MM: Type: IMPLANTABLE DEVICE | Site: FEMUR | Status: FUNCTIONAL

## 2024-01-20 DEVICE — SCREW BONE VA LK 3.5X80MM: Type: IMPLANTABLE DEVICE | Site: FEMUR | Status: FUNCTIONAL

## 2024-01-20 DEVICE — SCREW XL25 IM NAIL 40X5MM: Type: IMPLANTABLE DEVICE | Site: FEMUR | Status: FUNCTIONAL

## 2024-01-20 RX ORDER — KETOROLAC TROMETHAMINE 30 MG/ML
INJECTION, SOLUTION INTRAMUSCULAR; INTRAVENOUS
Status: DISCONTINUED | OUTPATIENT
Start: 2024-01-20 | End: 2024-01-20

## 2024-01-20 RX ORDER — ONDANSETRON HYDROCHLORIDE 2 MG/ML
4 INJECTION, SOLUTION INTRAVENOUS DAILY PRN
Status: DISCONTINUED | OUTPATIENT
Start: 2024-01-20 | End: 2024-01-20 | Stop reason: HOSPADM

## 2024-01-20 RX ORDER — HYDROMORPHONE HYDROCHLORIDE 2 MG/ML
0.2 INJECTION, SOLUTION INTRAMUSCULAR; INTRAVENOUS; SUBCUTANEOUS EVERY 5 MIN PRN
Status: DISCONTINUED | OUTPATIENT
Start: 2024-01-20 | End: 2024-01-20 | Stop reason: HOSPADM

## 2024-01-20 RX ORDER — ONDANSETRON HYDROCHLORIDE 2 MG/ML
INJECTION, SOLUTION INTRAVENOUS
Status: DISCONTINUED | OUTPATIENT
Start: 2024-01-20 | End: 2024-01-20

## 2024-01-20 RX ORDER — MIDAZOLAM HYDROCHLORIDE 1 MG/ML
INJECTION INTRAMUSCULAR; INTRAVENOUS
Status: DISCONTINUED | OUTPATIENT
Start: 2024-01-20 | End: 2024-01-20

## 2024-01-20 RX ORDER — SCOLOPAMINE TRANSDERMAL SYSTEM 1 MG/1
1 PATCH, EXTENDED RELEASE TRANSDERMAL ONCE
Status: DISCONTINUED | OUTPATIENT
Start: 2024-01-20 | End: 2024-01-22

## 2024-01-20 RX ORDER — IPRATROPIUM BROMIDE AND ALBUTEROL SULFATE 2.5; .5 MG/3ML; MG/3ML
3 SOLUTION RESPIRATORY (INHALATION)
Status: DISCONTINUED | OUTPATIENT
Start: 2024-01-20 | End: 2024-01-20 | Stop reason: HOSPADM

## 2024-01-20 RX ORDER — HYDROMORPHONE HYDROCHLORIDE 2 MG/ML
INJECTION, SOLUTION INTRAMUSCULAR; INTRAVENOUS; SUBCUTANEOUS
Status: DISCONTINUED | OUTPATIENT
Start: 2024-01-20 | End: 2024-01-20

## 2024-01-20 RX ORDER — PROPOFOL 10 MG/ML
VIAL (ML) INTRAVENOUS
Status: DISCONTINUED | OUTPATIENT
Start: 2024-01-20 | End: 2024-01-20

## 2024-01-20 RX ORDER — PHENYLEPHRINE HYDROCHLORIDE 10 MG/ML
INJECTION INTRAVENOUS
Status: DISCONTINUED | OUTPATIENT
Start: 2024-01-20 | End: 2024-01-20

## 2024-01-20 RX ORDER — LIDOCAINE HYDROCHLORIDE 10 MG/ML
1 INJECTION, SOLUTION EPIDURAL; INFILTRATION; INTRACAUDAL; PERINEURAL ONCE
Status: DISCONTINUED | OUTPATIENT
Start: 2024-01-20 | End: 2024-01-20 | Stop reason: HOSPADM

## 2024-01-20 RX ORDER — PROCHLORPERAZINE EDISYLATE 5 MG/ML
5 INJECTION INTRAMUSCULAR; INTRAVENOUS EVERY 30 MIN PRN
Status: DISCONTINUED | OUTPATIENT
Start: 2024-01-20 | End: 2024-01-20 | Stop reason: HOSPADM

## 2024-01-20 RX ORDER — ONDANSETRON HYDROCHLORIDE 2 MG/ML
4 INJECTION, SOLUTION INTRAVENOUS EVERY 8 HOURS PRN
Status: DISCONTINUED | OUTPATIENT
Start: 2024-01-20 | End: 2024-01-24 | Stop reason: HOSPADM

## 2024-01-20 RX ORDER — MEPERIDINE HYDROCHLORIDE 25 MG/ML
12.5 INJECTION INTRAMUSCULAR; INTRAVENOUS; SUBCUTANEOUS EVERY 10 MIN PRN
Status: DISCONTINUED | OUTPATIENT
Start: 2024-01-20 | End: 2024-01-20 | Stop reason: HOSPADM

## 2024-01-20 RX ORDER — VANCOMYCIN HYDROCHLORIDE 1 G/20ML
INJECTION, POWDER, LYOPHILIZED, FOR SOLUTION INTRAVENOUS
Status: DISCONTINUED | OUTPATIENT
Start: 2024-01-20 | End: 2024-01-20 | Stop reason: HOSPADM

## 2024-01-20 RX ORDER — DEXAMETHASONE SODIUM PHOSPHATE 4 MG/ML
INJECTION, SOLUTION INTRA-ARTICULAR; INTRALESIONAL; INTRAMUSCULAR; INTRAVENOUS; SOFT TISSUE
Status: DISCONTINUED | OUTPATIENT
Start: 2024-01-20 | End: 2024-01-20

## 2024-01-20 RX ORDER — HYDROMORPHONE HYDROCHLORIDE 2 MG/ML
0.4 INJECTION, SOLUTION INTRAMUSCULAR; INTRAVENOUS; SUBCUTANEOUS EVERY 5 MIN PRN
Status: DISCONTINUED | OUTPATIENT
Start: 2024-01-20 | End: 2024-01-20 | Stop reason: HOSPADM

## 2024-01-20 RX ORDER — MORPHINE SULFATE 10 MG/ML
4 INJECTION INTRAMUSCULAR; INTRAVENOUS; SUBCUTANEOUS EVERY 6 HOURS PRN
Status: DISCONTINUED | OUTPATIENT
Start: 2024-01-20 | End: 2024-01-24 | Stop reason: HOSPADM

## 2024-01-20 RX ORDER — LORAZEPAM 2 MG/ML
0.25 INJECTION INTRAMUSCULAR ONCE AS NEEDED
Status: DISCONTINUED | OUTPATIENT
Start: 2024-01-20 | End: 2024-01-20 | Stop reason: HOSPADM

## 2024-01-20 RX ORDER — CEFAZOLIN SODIUM 2 G/50ML
2 SOLUTION INTRAVENOUS ONCE
Status: COMPLETED | OUTPATIENT
Start: 2024-01-20 | End: 2024-01-20

## 2024-01-20 RX ORDER — SODIUM CHLORIDE, SODIUM GLUCONATE, SODIUM ACETATE, POTASSIUM CHLORIDE AND MAGNESIUM CHLORIDE 30; 37; 368; 526; 502 MG/100ML; MG/100ML; MG/100ML; MG/100ML; MG/100ML
INJECTION, SOLUTION INTRAVENOUS CONTINUOUS
Status: DISCONTINUED | OUTPATIENT
Start: 2024-01-20 | End: 2024-01-22

## 2024-01-20 RX ORDER — FENTANYL CITRATE 50 UG/ML
INJECTION, SOLUTION INTRAMUSCULAR; INTRAVENOUS
Status: DISCONTINUED | OUTPATIENT
Start: 2024-01-20 | End: 2024-01-20

## 2024-01-20 RX ORDER — LIDOCAINE HYDROCHLORIDE 20 MG/ML
INJECTION, SOLUTION EPIDURAL; INFILTRATION; INTRACAUDAL; PERINEURAL
Status: DISCONTINUED | OUTPATIENT
Start: 2024-01-20 | End: 2024-01-20

## 2024-01-20 RX ORDER — CEFAZOLIN SODIUM 2 G/50ML
2 SOLUTION INTRAVENOUS
Status: COMPLETED | OUTPATIENT
Start: 2024-01-20 | End: 2024-01-21

## 2024-01-20 RX ORDER — ONDANSETRON 4 MG/1
8 TABLET, ORALLY DISINTEGRATING ORAL EVERY 6 HOURS PRN
Status: DISCONTINUED | OUTPATIENT
Start: 2024-01-20 | End: 2024-01-20 | Stop reason: HOSPADM

## 2024-01-20 RX ORDER — ROCURONIUM BROMIDE 10 MG/ML
INJECTION, SOLUTION INTRAVENOUS
Status: DISCONTINUED | OUTPATIENT
Start: 2024-01-20 | End: 2024-01-20

## 2024-01-20 RX ORDER — MIDAZOLAM HYDROCHLORIDE 1 MG/ML
1 INJECTION INTRAMUSCULAR; INTRAVENOUS ONCE AS NEEDED
Status: COMPLETED | OUTPATIENT
Start: 2024-01-20 | End: 2024-01-20

## 2024-01-20 RX ADMIN — FENTANYL CITRATE 100 MCG: 50 INJECTION, SOLUTION INTRAMUSCULAR; INTRAVENOUS at 09:01

## 2024-01-20 RX ADMIN — HYDROMORPHONE HYDROCHLORIDE 0.4 MG: 2 INJECTION INTRAMUSCULAR; INTRAVENOUS; SUBCUTANEOUS at 11:01

## 2024-01-20 RX ADMIN — CEFAZOLIN SODIUM 2 G: 2 SOLUTION INTRAVENOUS at 05:01

## 2024-01-20 RX ADMIN — HYDROMORPHONE HYDROCHLORIDE 0.2 MG: 2 INJECTION, SOLUTION INTRAMUSCULAR; INTRAVENOUS; SUBCUTANEOUS at 10:01

## 2024-01-20 RX ADMIN — OXYCODONE HYDROCHLORIDE 10 MG: 10 TABLET ORAL at 01:01

## 2024-01-20 RX ADMIN — HYDROMORPHONE HYDROCHLORIDE 0.4 MG: 2 INJECTION, SOLUTION INTRAMUSCULAR; INTRAVENOUS; SUBCUTANEOUS at 10:01

## 2024-01-20 RX ADMIN — METHOCARBAMOL 500 MG: 500 TABLET ORAL at 11:01

## 2024-01-20 RX ADMIN — PROPOFOL 150 MG: 10 INJECTION, EMULSION INTRAVENOUS at 09:01

## 2024-01-20 RX ADMIN — METHOCARBAMOL 500 MG: 500 TABLET ORAL at 09:01

## 2024-01-20 RX ADMIN — ENOXAPARIN SODIUM 40 MG: 100 INJECTION SUBCUTANEOUS at 11:01

## 2024-01-20 RX ADMIN — CEFAZOLIN SODIUM 2 G: 2 SOLUTION INTRAVENOUS at 09:01

## 2024-01-20 RX ADMIN — ROCURONIUM BROMIDE 50 MG: 10 SOLUTION INTRAVENOUS at 09:01

## 2024-01-20 RX ADMIN — OXYCODONE HYDROCHLORIDE 10 MG: 10 TABLET ORAL at 09:01

## 2024-01-20 RX ADMIN — ONDANSETRON 4 MG: 2 INJECTION INTRAMUSCULAR; INTRAVENOUS at 09:01

## 2024-01-20 RX ADMIN — DEXAMETHASONE SODIUM PHOSPHATE 4 MG: 4 INJECTION, SOLUTION INTRA-ARTICULAR; INTRALESIONAL; INTRAMUSCULAR; INTRAVENOUS; SOFT TISSUE at 09:01

## 2024-01-20 RX ADMIN — Medication 6 MG: at 09:01

## 2024-01-20 RX ADMIN — PHENYLEPHRINE HYDROCHLORIDE 100 MCG: 10 INJECTION INTRAVENOUS at 09:01

## 2024-01-20 RX ADMIN — LIDOCAINE HYDROCHLORIDE 4 ML: 20 INJECTION, SOLUTION EPIDURAL; INFILTRATION; INTRACAUDAL; PERINEURAL at 09:01

## 2024-01-20 RX ADMIN — ROCURONIUM BROMIDE 10 MG: 10 SOLUTION INTRAVENOUS at 10:01

## 2024-01-20 RX ADMIN — OXYCODONE HYDROCHLORIDE 10 MG: 10 TABLET ORAL at 06:01

## 2024-01-20 RX ADMIN — ONDANSETRON 4 MG: 2 INJECTION INTRAMUSCULAR; INTRAVENOUS at 04:01

## 2024-01-20 RX ADMIN — KETOROLAC TROMETHAMINE 30 MG: 30 INJECTION, SOLUTION INTRAMUSCULAR; INTRAVENOUS at 10:01

## 2024-01-20 RX ADMIN — SODIUM CHLORIDE, POTASSIUM CHLORIDE, SODIUM LACTATE AND CALCIUM CHLORIDE: 600; 310; 30; 20 INJECTION, SOLUTION INTRAVENOUS at 12:01

## 2024-01-20 RX ADMIN — SCOPOLAMINE 1 PATCH: 1 PATCH TRANSDERMAL at 09:01

## 2024-01-20 RX ADMIN — MIDAZOLAM HYDROCHLORIDE 1 MG: 1 INJECTION, SOLUTION INTRAMUSCULAR; INTRAVENOUS at 09:01

## 2024-01-20 RX ADMIN — ENOXAPARIN SODIUM 40 MG: 100 INJECTION SUBCUTANEOUS at 09:01

## 2024-01-20 RX ADMIN — SODIUM CHLORIDE, POTASSIUM CHLORIDE, SODIUM LACTATE AND CALCIUM CHLORIDE: 600; 310; 30; 20 INJECTION, SOLUTION INTRAVENOUS at 01:01

## 2024-01-20 RX ADMIN — SUGAMMADEX 150 MG: 100 INJECTION, SOLUTION INTRAVENOUS at 10:01

## 2024-01-20 RX ADMIN — MIDAZOLAM HYDROCHLORIDE 2 MG: 1 INJECTION, SOLUTION INTRAMUSCULAR; INTRAVENOUS at 09:01

## 2024-01-20 RX ADMIN — CITALOPRAM HYDROBROMIDE 20 MG: 20 TABLET ORAL at 09:01

## 2024-01-20 NOTE — ANESTHESIA POSTPROCEDURE EVALUATION
Anesthesia Post Evaluation    Patient: Nurys Joe    Procedure(s) Performed: Procedure(s) (LRB):  INSERTION, INTRAMEDULLARY LAURENT, FEMUR, DISTAL, RETROGRADE (Left)    Final Anesthesia Type: general      Patient location during evaluation: PACU  Patient participation: Yes- Able to Participate  Level of consciousness: awake and alert  Post-procedure vital signs: reviewed and stable  Pain management: adequate  Airway patency: patent    PONV status at discharge: No PONV  Anesthetic complications: no      Cardiovascular status: blood pressure returned to baseline  Respiratory status: spontaneous ventilation and room air  Hydration status: euvolemic  Follow-up not needed.              Vitals Value Taken Time   /71 01/20/24 1407   Temp 37 01/20/24 1550   Pulse 76 01/20/24 1438   Resp 18 01/20/24 1314   SpO2 92 % 01/20/24 1438         Event Time   Out of Recovery 01/20/2024 11:57:53         Pain/Gia Score: Pain Rating Prior to Med Admin: 7 (1/20/2024  1:14 PM)  Pain Rating Post Med Admin: 2 (1/20/2024 12:05 AM)  Gia Score: 9 (1/20/2024 11:55 AM)

## 2024-01-20 NOTE — ANESTHESIA PREPROCEDURE EVALUATION
01/20/2024  Nurys Joe is a 63 y.o., female with PONV admitted January 19th after falling and sustaining closed distal left femur fracture.  Patient presents for IM lisbeth of left distal femur.        Pre-op Assessment    I have reviewed the Patient Summary Reports.    I have reviewed the NPO Status.   I have reviewed the Medications.     Review of Systems  Anesthesia Hx:              Personal Hx of Anesthesia complications, Post-Operative Nausea/Vomiting                    Social:  Non-Smoker       Hematology/Oncology:       -- Anemia:                                  Cardiovascular:     Hypertension                Functional Capacity good / => 4 METS                             Physical Exam  General: Well nourished, Cooperative, Alert and Oriented    Airway:  Mallampati: II   Mouth Opening: Normal  TM Distance: Normal  Tongue: Normal  Neck ROM: Normal ROM    Dental:  Intact, Caps / Implants    Chest/Lungs:  Clear to auscultation, Normal Respiratory Rate    Heart:  Rate: Normal  Rhythm: Regular Rhythm        Anesthesia Plan  Type of Anesthesia, risks & benefits discussed:    Anesthesia Type: Gen ETT  Intra-op Monitoring Plan: Standard ASA Monitors  Post Op Pain Control Plan: multimodal analgesia and IV/PO Opioids PRN  Induction:  IV  Airway Plan: Direct  Informed Consent: Informed consent signed with the Patient and all parties understand the risks and agree with anesthesia plan.  All questions answered.   ASA Score: 2  Day of Surgery Review of History & Physical: H&P Update referred to the surgeon/provider.    Ready For Surgery From Anesthesia Perspective.     .

## 2024-01-20 NOTE — PT/OT/SLP PROGRESS
"Occupational Therapy      Patient Name:  Nurys Joe   MRN:  [id number]    Patient feeling "woozy" following PT evaluation as it was post op day 1. Will follow-up tomorrow.    1/20/2024  "

## 2024-01-20 NOTE — PROGRESS NOTES
"   Trauma Surgery   Progress Note  Admit Date: 1/19/2024  HD#1  POD#Day of Surgery    Subjective:   Interval history:  Afebrile, VSS. Patient reports overall good pain control except when moving her leg. Ready for surgery today.    PMH: HTN, anxiety    Home Meds:   Current Outpatient Medications   Medication Instructions    amLODIPine (NORVASC) 5 mg, Oral    citalopram (CELEXA) 40 mg, Oral    conjugated estrogens (PREMARIN) vaginal cream   See Instructions, 0.5gms vaginally q hs x 14 then 2-3x/week, # 30 gm, 6 Refill(s), Pharmacy: [facility]- [facility], 168, cm, Height/Length Dosing, 08/18/22 10:16:00 CDT, 65.6, kg, Weight Dosing, 08/18/22 10:15:00 CDT    conjugated estrogens (PREMARIN) 0.5 g, Vaginal, Daily    losartan (COZAAR) 50 mg, Oral      Scheduled Meds:   amLODIPine  5 mg Oral Daily    ceFAZolin (Ancef) IV (PEDS and ADULTS)  2 g Intravenous Q8H    citalopram  20 mg Oral QHS    enoxparin  40 mg Subcutaneous Q12H (prophylaxis, 0900/2100)    losartan  50 mg Oral Daily    methocarbamoL  500 mg Oral TID    polyethylene glycol  17 g Oral Daily    scopolamine  1 patch Transdermal Once     Continuous Infusions:   electrolyte-A      lactated ringers 100 mL/hr at 01/20/24 0000     PRN Meds:acetaminophen, acetaminophen, LIDOcaine HCL 10 mg/ml (1%), melatonin, morphine, naloxone, oxyCODONE, oxyCODONE, sodium chloride 0.9%     Objective:     VITAL SIGNS: 24 HR MIN & MAX LAST   Temp  Min: 98.2 °F (36.8 °C)  Max: 99.3 °F (37.4 °C)  98.2 °F (36.8 °C)   BP  Min: 112/74  Max: 138/85  120/72    Pulse  Min: 70  Max: 88  76    Resp  Min: 12  Max: 21  15    SpO2  Min: 92 %  Max: 100 %  100 %      HT: 5' 6" (167.6 cm)  WT: 65.8 kg (145 lb)  BMI: 23.4     Intake/output:  Intake/Output - Last 3 Shifts         01/18 0700 01/19 0659 01/19 0700 01/20 0659 01/20 0700  01/21 0659    I.V. (mL/kg)   800 (12.2)    Total Intake(mL/kg)   800 (12.2)    Blood   80    Total Output   80    Net   +720                   Intake/Output " "Summary (Last 24 hours) at 1/20/2024 1218  Last data filed at 1/20/2024 1046  Gross per 24 hour   Intake 800 ml   Output 80 ml   Net 720 ml         Lines/drains/airway:       Peripheral IV - Single Lumen 01/19/24 1530 20 G Right Antecubital (Active)   Site Assessment Clean;Dry;Intact;No redness;No swelling 01/20/24 1103   Dressing Status Clean;Dry;Intact 01/20/24 1103   Number of days: 0       Physical examination:  Gen: NAD, AAOx3, answering questions appropriately  HEENT: Normocephalic, atraumatic  CV: RR, 2+ DPs bilaterally  Resp: NWOB  Abd: S/NT/ND  Msk: moving all extremities spontaneously and purposefully. Pain with movement of LLE.   Neuro: CN II-XII grossly intact  Skin/wounds: Warm, dry, intact.    Labs:  Renal:  Recent Labs     01/19/24  1708 01/20/24  0408   BUN 17.6 15.5   CREATININE 1.12* 0.84     No results for input(s): "LACTIC" in the last 72 hours.  FEN/GI:  Recent Labs     01/19/24  1708 01/20/24  0408    139   K 4.4 4.6   CO2 25 28   CALCIUM 9.8 8.9   MG  --  2.00   PHOS  --  4.0   ALBUMIN 4.3  --    BILITOT 0.3  --    AST 19  --    ALKPHOS 63  --    ALT 18  --      Heme:  Recent Labs     01/19/24  1708 01/20/24  0408   HGB 12.6 11.7*   HCT 37.8 35.9*    203   INR 1.0  --      ID:  Recent Labs     01/19/24  1708 01/20/24  0408   WBC 12.17* 8.05     CBG:  Recent Labs     01/19/24 1708 01/20/24  0408   GLUCOSE 116* 121*      No results for input(s): "POCTGLUCOSE" in the last 72 hours.   Cardiovascular:  No results for input(s): "TROPONINI", "CKTOTAL", "CKMB", "BNP" in the last 168 hours.  I have reviewed all pertinent lab results within the past 24 hours.    Imaging:  X-Ray Femur 2 View Left   Final Result      Expected postoperative changes following intramedullary lisbeth fixation of the left femur.         Electronically signed by: Sotero Kinney MD   Date:    01/20/2024   Time:    11:24      SURG FL Surgery Fluoro Usage   Final Result      CT 3D RECON WITHOUT INDEPENDENT WS   Final Result "      CT Hip Without Contrast Left   Final Result      No evidence of acute fracture      Likely old avulsion injury at the tip of the greater trochanter         Electronically signed by: Len Keys   Date:    01/19/2024   Time:    17:11      CT Knee Without Contrast Left   Final Result      Fracture of the distal femur as outlined above         Electronically signed by: Len Keys   Date:    01/19/2024   Time:    17:19      X-Ray Chest AP Portable   Final Result      No acute pulmonary process identified.         Electronically signed by: Vincent Ruby   Date:    01/19/2024   Time:    16:43      X-Ray Hip 2 or 3 views Left (with Pelvis when performed)   Final Result      No acute osseous abnormality identified.         Electronically signed by: Robbin Kirkland   Date:    01/19/2024   Time:    16:25      X-Ray Knee Complete 4 or More Views Left   Final Result      Fracture distal femur.         Electronically signed by: Robbin Kirkland   Date:    01/19/2024   Time:    16:24         I have reviewed all pertinent imaging results/findings within the past 24 hours.    Micro/Path/Other:  Microbiology Results (last 7 days)       ** No results found for the last 168 hours. **           Specimen (168h ago, onward)      None             Problems list:  Active Problem List with Overview Notes    Diagnosis Date Noted    Closed fracture of left distal femur 01/20/2024    Closed nondisplaced fracture of greater trochanter of left femur with malunion 01/20/2024        Assessment & Plan:     Closed distal left femur fracture  - NPO pending OR this morning, OK for regular diet postop  - Orthopedics planning surgical fixation this morning  - MMPC  - PT/OT following fixation  - Fall precautions     HTN  - Continue home antihypertensives     Anxiety  - Continue home citalopram     Fall  - MMPC  - Lovenox 40mg q12h for VTE ppx  - Fall and standard precautions   - Hourly IS while awake    Bozena Kingston, AGACNP-BC, FNP-BC  Trauma  Surgery  [facility]  C: [phone]

## 2024-01-20 NOTE — OP NOTE
OPERATIVE REPORT    Patient: Nurys Joe   : 1960    MRN: [id number]  Date: 2024      Surgeon:Kraig Saldivar DO  Assistant: Imtiaz Nuno was essential, part of the procedure including deep hardware placement and deep closure.  No senior assistant was availible  Preoperative Diagnosis: Left distal femur fracture with intercondylar extension and displacement  Postoperative Diagnosis: Same  Procedure:    1) Open reduction and internal fixation left distal femur fracture with intracondylar split - 03567  Anesthesiologist: Eric Rivera Jr., MD  OR Staff: Circulator: Juliano Servin RN; Luly Ward RN  Physician Assistant: Maru Nuno PA-C  Radiology Technologist: Jessee Ruvalcaba, RT  Scrub Person: Nurys Ruby  Implants:   Implant Name Type Inv. Item Serial No.  Lot No. LRB No. Used Action   LAURENT REAM BALL TP [id number] - [id number]  LAURENT REAM BALL TP [id number]  [employer] [id number] Left 1 Implanted   5.5mm cannulated compression headless screw // 48mm    [employer]  Left 1 Implanted   5.5mm cannulated compression headless screw // 50 mm    [employer]  Left 1 Implanted   NAIL IM RFN-ADV 5 D [id number] - [id number]  NAIL IM RFN-ADV 5 D [id number]  [employer] [id number] Left 1 Implanted   WIRE GUIDE 3.2MM 400MM - [id number]  WIRE GUIDE 3.2MM 400MM  [employer]  Left 1 Implanted   WASHER ORTH RFNA LEFT 10 DEG - [id number]  WASHER ORTH RFNA LEFT 10 DEG  [employer] [id number] Left 1 Implanted   SCREW OPTILINK T25 5.0X60MM - [id number]  SCREW OPTILINK T25 5.0X60MM  [employer]  Left 1 Implanted   SCREW OPTILINK T25 5.0X80MM - [id number]  SCREW OPTILINK T25 5.0X80MM  [employer]  Left 1 Implanted   SCREW STRDRV VA JOAN 3.5X70MM - [id number]  SCREW STRDRV VA JOAN 3.5X70MM  [employer]  Left 1 Implanted   SCREW BONE VA LK 3.5X80MM - [id number]  SCREW BONE VA LK 3.5X80MM  [facility]  Left 2 Implanted   SCREW XL25 IM NAIL 36X5MM - [id number]   SCREW XL25 IM NAIL 36X5MM  [employer]  Left 1 Implanted   SCREW XL25 IM NAIL 40X5MM - [id number]  SCREW XL25 IM NAIL 40X5MM  [employer]  Left 1 Implanted     EBL: 200cc  Complications: None  Disposition: To PACU, stable    Indications: Nurys Joe is a 63 y.o. female presenting with the aforementioned injuries/findings. The procedure is indicated to best obtain stability about the distal femur.  The patient is awake and alert. After thorough discussion of the risks, benefits, expected outcomes, and alternatives to surgical intervention, the patient agreed to proceed with surgical treatment.  Specific risks discussed included, but were not limited to: superficial or deep infection, wound healing complications, DVT/PE, significant bleeding requiring transfusion, damage to named anatomic structures in the immediate area including named neurovascular structures, infection, malunion, nonunion, pain, arthritic changes, and general risks of anesthesia.  The patient voiced understanding and written as well as verbal consent was obtained by myself prior to the procedure.    Procedure Note:  The patient was brought back to the OR and placed supine on the OR table. After successful induction of anesthesia by anesthesia staff, the patient was positioned in the supine position and all bony prominences were padded appropriately.  The surgical field was then provisionally cleansed and then prepped and draped in the usual sterile fashion.    At this time a time-out was performed, with the correct patient, site, and procedure identified.  The universal time out as well as sign your site protocols were followed.  Preoperative antibiotics were verified as administered.     First approached the distal femoral segment through a standard midline approach with a medial parapatellar arthrotomy protect the articular surface we then subluxed the patella medially and fracture lines came into view.  Patient has a significant step-off for  intercondylar fracture.  She also has significant patellofemoral arthritis grade 3.  I then reduced the fracture using appropriate clamp followed by 2 headless compression screws from [employer] 5[employer].  We put these under direct visualization of the medial and lateral side end of the articular surface in the are well below the articular surface.      I then opened the lateral knee , and attention to hemostasis was paid using electrocautery.  I elevated tissue from the lateral femur and chose the appropriate LAW plate for the retrograde nail.  I placed this in position and was able to obtain a reduction using manual manipulation of the limb and fragments.       A small longitudinal incision was made inferior to the patella. Bovie was used for hemostasis.  The patella tendon was spared, as we retracted this laterally. Soft tissues and patella were protected, and a guide wire was placed in the appropriate starting position.  Once this position was confirmed with  C-arm  in multiple planes, the wire was advanced into the distal femur. The starting reamer was then used, through protected soft tissues to create the entry hole over the guide wire.  Next, a long beaded-tip reaming wire was placed into the intramedullary canal of the femur.  C-arm images in multiple planes confirmed successful crossing of the fracture site and intramedullary location of this wire in the proximal segment. Intramedullary length was measured, and the intramedullary canal was sequentially reamed to a final diameter 1.5mm above the final nail diameter. Care was taken to maintain fracture reduction during the reaming process, and no eccentric reaming was performed    A [employer] retrograde femur nail with the above measurements was assembled to the jig, and inserted into the femur over the guide wire.  C-arm imaging confirmed full insertion of the nail, with no intra-articular prominence.  Fracture reduction and alignment was well maintained.   Interlocking screws were placed distally using the jig through the plate and proximal interlocking screws were placed using free-hand perfect [place] technique.  All interlocking screws proximally were placed through small incisions, blunt dissection, and with neuro-vascular structures protected. I then placed several additional screws in the distal block of the plate without difficulty.    Final C-arm images confirmed excellent alignment and reduction of the fracture.  All hardware was in good position.  The leg clinically appeared to have normal rotational alignment as compared to exam of the opposite side.     Fluoroscopic evaluation of the hip after the nail was placed showed no femoral neck injury.  In addition, exam of the knee after nail placement showed no signs of ligamentous laxity.    The incision(s) was/were then irrigated using copious sterile saline and then closed in layered fashion with an addition of vanco into the wounds.  The leg was sterilely cleansed and dressed.    The patient was then subsequently transferred to to PACU in a stable condition.    All sponge and needle counts were correct at the end of the case.  I was present and participated in all aspects of the procedure.    Prognosis:  The patient will be kept NWB on the ipsilateral extremity 8-10 weeks.  DVT prophylaxis is indicated for this patient and procedure.       This note/OR report was created with the assistance of  voice recognition software or phone  dictation.  There may be transcription errors as a result of using this technology however minimal. Effort has been made to assure accuracy of transcription but any obvious errors or omissions should be clarified with the author of the document.       Kraig Saldivar, DO  Orthopedic Trauma Surgery

## 2024-01-20 NOTE — PLAN OF CARE
Problem: Physical Therapy  Goal: Physical Therapy Goal  Description: Pt will be seen for the following goals  1. Pt will perform bed mobility sba  2. Pt will perform sit to stand with a rw sba  3. Pt will amb NWB LLE with a rw 25ft cga  4. Pt will go up and down 4 steps with right sided rail and AD if needed  Outcome: Ongoing, Progressing

## 2024-01-20 NOTE — PT/OT/SLP EVAL
Physical Therapy Evaluation    Patient Name:  Nurys Joe   MRN:  [id number]    Recommendations:     Discharge therapy intensity: Low Intensity Therapy   Discharge Equipment Recommendations: walker, rolling   Barriers to discharge: Impaired mobility    Assessment:     Nurys Joe is a 63 y.o. female admitted with a medical diagnosis of Closed fracture of left distal femur.  She presents with the following impairments/functional limitations: impaired endurance, weakness, impaired self care skills, impaired functional mobility, gait instability, decreased lower extremity function, pain . Pt was a little lightheaded and dizzy from surgery. I think she will fxn'll progress rapidly  .    Rehab Prognosis: Good; patient would benefit from acute skilled PT services to address these deficits and reach maximum level of function.    Recent Surgery: Procedure(s) (LRB):  INSERTION, INTRAMEDULLARY LAURENT, FEMUR, DISTAL, RETROGRADE (Left) Day of Surgery    Plan:     During this hospitalization, patient to be seen 6 x/week to address the identified rehab impairments via gait training, therapeutic activities, therapeutic exercises and progress toward the following goals:    Plan of Care Expires:  01/20/24    Subjective     Chief Complaint:   Patient/Family Comments/goals:   Pain/Comfort:       Patients cultural, spiritual, Shinto conflicts given the current situation:      Living Environment:  Pt lives alone in a house with 4 steps and an right sided rail  Prior to admission, patients level of function was ind.  Equipment used at home: none.  DME owned (not currently used): none.  Upon discharge, patient will have assistance from her son.    Objective:     Communicated with nurse prior to session.  Patient found supine with PureWick, peripheral IV, pulse ox (continuous), telemetry  upon PT entry to room.    General Precautions: Standard, fall  Orthopedic Precautions:LLE non weight bearing   Braces:    Respiratory Status:  Room air  Blood Pressure:       Exams:  RLE ROM: WFL  RLE Strength: WFL  LLE ROM: limited  LLE Strength: limited  Skin integrity: Visible skin intact      Functional Mobility:  Bed Mobility:     Supine to Sit: minimum assistance  Sit to Supine: minimum assistance  Transfers:     Sit to Stand:  minimum assistance with rolling walker  Gait: pt was too dizzy to walk this afternoon      AM-PAC 6 CLICK MOBILITY  Total Score:        Treatment & Education:      Patient provided with verbal education education regarding PT role/goals/POC.  Understanding was verbalized.     Patient left supine with all lines intact and call button in reach.    GOALS:   Multidisciplinary Problems       Physical Therapy Goals          Problem: Physical Therapy    Goal Priority Disciplines Outcome Goal Variances Interventions   Physical Therapy Goal     PT, PT/OT Ongoing, Progressing     Description: Pt will be seen for the following goals  1. Pt will perform bed mobility sba  2. Pt will perform sit to stand with a rw sba  3. Pt will amb NWB LLE with a rw 25ft cga  4. Pt will go up and down 4 steps with right sided rail and AD if needed                       History:     Past Medical History:   Diagnosis Date    Anxiety     Hypertension        Past Surgical History:   Procedure Laterality Date    COLONOSCOPY  04/2019       Time Tracking:     PT Received On:    PT Start Time: 1355     PT Stop Time: 1415  PT Total Time (min): 20 min     Billable Minutes: Evaluation 20 01/20/2024

## 2024-01-20 NOTE — TRANSFER OF CARE
"Anesthesia Transfer of Care Note    Patient: Nurys Joe    Procedure(s) Performed: Procedure(s) (LRB):  INSERTION, INTRAMEDULLARY LAURENT, FEMUR, DISTAL, RETROGRADE (Left)    Patient location: PACU    Anesthesia Type: general    Transport from OR: Transported from OR on room air with adequate spontaneous ventilation    Post pain: adequate analgesia    Post assessment: no apparent anesthetic complications and tolerated procedure well    Post vital signs: stable    Level of consciousness: awake and alert    Nausea/Vomiting: no nausea/vomiting    Complications: none    Transfer of care protocol was followed      Last vitals: Visit Vitals  /83   Pulse 82   Temp 36.8 °C (98.2 °F) (Oral)   Resp 15   Ht 5' 6" (1.676 m)   Wt 65.8 kg (145 lb)   SpO2 98%   BMI 23.40 kg/m²     "

## 2024-01-20 NOTE — CONSULTS
[facility] - Emergency Dept  Orthopedic Trauma  Consult Note    Patient Name: Nurys Joe  MRN: [id number]  Admission Date: 1/19/2024  Hospital Length of Stay: 1 days  Attending Provider: Ruben Cantu MD  Primary Care Provider: Jp Choi MD        Inpatient consult to Orthopedic Surgery  Consult performed by: Kraig Saldivar DO  Consult ordered by: Martinez Ashton MD        Subjective:         Chief Complaint:   Chief Complaint   Patient presents with    Fall     Trip and fell down x4 stairs landing on brick ean onto her L. Hip/knee. (-) BT, (-) head trauma, (-)LOC. LLE internally rotated, 2+ DP, swelling noted to knee, no bruising/open wound.         HPI:  Patient is a 63-year-old female she is a .  She tripped over stairs fell onto her left knee.  She has a left distal femur with intercondylar extension.  She has been NPO over midnight.  Pain is well controlled at this time.  She has a subacute chronic injury to the left proximal femur which will not be treated today.  She is dull achy pain worse with range of motion better at rest no numbness no tingling.    Past Medical History:   Diagnosis Date    Anxiety     Hypertension        Past Surgical History:   Procedure Laterality Date    COLONOSCOPY  04/2019       Review of patient's allergies indicates:   Allergen Reactions    Sulfamethoxazole      Other reaction(s): unknown reaction       Current Facility-Administered Medications   Medication    acetaminophen tablet 650 mg    acetaminophen tablet 650 mg    amLODIPine tablet 5 mg    citalopram tablet 20 mg    enoxaparin injection 40 mg    HYDROmorphone (PF) injection 0.5 mg    lactated ringers infusion    LIDOcaine HCL 10 mg/ml (1%) injection 1 mL    losartan tablet 50 mg    melatonin tablet 6 mg    methocarbamoL tablet 500 mg    naloxone 0.4 mg/mL injection 0.4 mg    ondansetron disintegrating tablet 8 mg    oxyCODONE immediate release tablet 5 mg    oxyCODONE immediate  "release tablet Tab 10 mg    polyethylene glycol packet 17 g    sodium chloride 0.9% flush 10 mL     Current Outpatient Medications   Medication Sig    amLODIPine (NORVASC) 5 MG tablet Take 5 mg by mouth.    citalopram (CELEXA) 40 MG tablet Take 40 mg by mouth.    conjugated estrogens (PREMARIN) vaginal cream   See Instructions, 0.5gms vaginally q hs x 14 then 2-3x/week, # 30 gm, 6 Refill(s), Pharmacy: [facility]- [facility] Melendez, 168, cm, Height/Length Dosing, 08/18/22 10:16:00 CDT, 65.6, kg, Weight Dosing, 08/18/22 10:15:00 CDT    conjugated estrogens (PREMARIN) vaginal cream Place 0.5 g vaginally once daily.    losartan (COZAAR) 50 MG tablet Take 50 mg by mouth.     Family History       Problem Relation (Age of Onset)    Heart failure Father    Stroke Paternal Grandfather          Tobacco Use    Smoking status: Never    Smokeless tobacco: Never   Substance and Sexual Activity    Alcohol use: Yes    Drug use: Never    Sexual activity: Not Currently     Birth control/protection: Post-menopausal, Other-see comments       ROS:  Constitutional: Denies fever chills  Eyes: No change in vision  ENT: No ringing or current infections  CV: No chest pain  Resp: No labored breathing  MSK: Pain evident at site of injury located in HPI,   Integ: No signs of abrasions or lacerations  Neuro: No numbness or tingling  Lymphatic: No swelling outside the area of injury   Objective:     Vital Signs (Most Recent):  Temp: 98.2 °F (36.8 °C) (01/20/24 0700)  Pulse: 70 (01/20/24 0700)  Resp: 17 (01/20/24 0700)  BP: 122/78 (01/20/24 0700)  SpO2: 96 % (01/20/24 0700) Vital Signs (24h Range):  Temp:  [98.2 °F (36.8 °C)-99.3 °F (37.4 °C)] 98.2 °F (36.8 °C)  Pulse:  [70-85] 70  Resp:  [14-19] 17  SpO2:  [96 %-100 %] 96 %  BP: (112-132)/(70-91) 122/78     Weight: 65.8 kg (145 lb)  Height: 5' 6" (167.6 cm)  Body mass index is 23.4 kg/m².    No intake or output data in the 24 hours ending 01/20/24 0748    Ortho/SPM Exam  General the patient is " alert and oriented x3 no acute distress nontoxic-appearing appropriate affect.    Constitutional: Vital signs are examined and stable.  Resp: No signs of labored breathing                 LLE: -Skin:  Painful range of motion of the knee.  Minimal pain proximal femur.  No signs of new abrasions or lacerations, no scars           -MSK:  EHL/FHL, Gastroc/Tib anterior Strength 5/5           -Neuro:  Sensation intact to light touch L3-S1 dermatomes           -Lymphatic: No signs of lymphadenopathy           -CV: Capillary refill is less than 2 seconds. DP/PT pulses 2/4. Compartments soft and compressible       Significant Labs:  I have reviewed all labs in relation to Orthopedics  Recent Lab Results         01/20/24  0408   01/19/24  1809   01/19/24  1708        Albumin/Globulin Ratio     1.8       ABO and RH   A POS         Albumin     4.3       ALP     63       ALT     18       Anion Gap 7.0           AST     19       Baso # 0.04     0.05       Basophil % 0.5     0.4       BILIRUBIN TOTAL     0.3       BUN 15.5     17.6       BUN/CREAT RATIO 18           Calcium 8.9     9.8       Chloride 104     105       CO2 28     25       Creatinine 0.84     1.12       eGFR >60     55       Eos # 0.09     0.03       Eosinophil % 1.1     0.2       Globulin, Total     2.4       Glucose 121     116       Group & Rh     A POS       Hematocrit 35.9     37.8       Hemoglobin 11.7     12.6       Immature Grans (Abs) 0.03     0.02       Immature Granulocytes 0.4     0.2       Indirect Brenda GEL     NEG       INR     1.0       Lymph # 1.05     0.65       LYMPH % 13.0     5.3       Magnesium  2.00           MCH 29.0     29.6       MCHC 32.6     33.3       MCV 89.1     88.7       Mono # 0.51     0.30       Mono % 6.3     2.5       MPV 9.7     9.4       Neut # 6.33     11.12       Neut % 78.7     91.4       nRBC 0.0     0.0       Phosphorus Level 4.0           Platelet Count 203     240       Potassium 4.6     4.4       PROTEIN TOTAL      6.7       Protime     13.1       RBC 4.03     4.26       RDW 12.4     12.3       Sodium 139     140       Specimen Outdate     01/22/2024 23:59       WBC 8.05     12.17               Significant Imaging: I have reviewed all pertinent imaging results/findings.  CT 3D RECON WITHOUT INDEPENDENT WS    Result Date: 1/19/2024  EXAMINATION: CT 3D WITHOUT INDEPENDENT WS CLINICAL HISTORY: left knee CT surgical planning; Electronically signed by: Robbin Kirkland Date:    01/19/2024 Time:    19:51    CT Knee Without Contrast Left    Result Date: 1/19/2024  EXAMINATION: CT KNEE WITHOUT CONTRAST LEFT CLINICAL HISTORY: Fracture, knee; TECHNIQUE: Multiple axial images were obtained of the left knee and sagittal and coronal reconstructions were performed. COMPARISON: Plain film dated 01/19/2024 FINDINGS: There is an obliquely oriented fracture of the distal femoral metaphysis.  The fracture extends inferiorly into the intracondylar region.  There is some displacement seen.  The fracture terminates at the lateral femoral condyle at the level the condylar notch.  No tibia fracture is seen.  No fibula fracture is seen.  The patella is intact.  There is a suprapatellar bursa effusion.     Fracture of the distal femur as outlined above Electronically signed by: Len Keys Date:    01/19/2024 Time:    17:19    CT Hip Without Contrast Left    Result Date: 1/19/2024  EXAMINATION: CT HIP WITHOUT CONTRAST LEFT CLINICAL HISTORY: Hip trauma, fracture suspected, xray done; TECHNIQUE: Multiple axial images were obtained of the left hip and sagittal and coronal reconstructions were performed.  Automatic exposure control (AEC) is utilized to reduce patient radiation exposure. COMPARISON: Plain film dated 01/19/2024 FINDINGS: The bones and joints are in good anatomic alignment. There is a chronic appearing avulsion type injury at the tip of the greater trochanter. No convincing evidence of acute fracture is seen.  No dislocation is seen.  No soft  tissue abnormality seen. No hematoma is seen. No soft tissue swelling seen.     No evidence of acute fracture Likely old avulsion injury at the tip of the greater trochanter Electronically signed by: Len Keys Date:    01/19/2024 Time:    17:11    X-Ray Chest AP Portable    Result Date: 1/19/2024  EXAMINATION: XR CHEST AP PORTABLE CLINICAL HISTORY: pre-op; TECHNIQUE: Frontal view(s) of the chest. COMPARISON: No relevant comparison studies available at the time of dictation. FINDINGS: Normal cardiac silhouette.  The lungs are well-inflated.  No consolidation identified.  No significant pleural effusion or discernible pneumothorax.     No acute pulmonary process identified. Electronically signed by: Vincent Ruby Date:    01/19/2024 Time:    16:43    X-Ray Hip 2 or 3 views Left (with Pelvis when performed)    Result Date: 1/19/2024  EXAMINATION: XR HIP WITH PELVIS WHEN PERFORMED, 2 OR 3 VIEWS LEFT CLINICAL HISTORY: Four. TECHNIQUE: Three views COMPARISON: None available FINDINGS: Articular surfaces are unremarkable and there is no intrinsic osseous abnormality.  No acute or dislocation.  There are mild degenerative arthritic changes.  There is calcification adjacent to the greater trochanter.     No acute osseous abnormality identified. Electronically signed by: Robbin Kirkland Date:    01/19/2024 Time:    16:25    X-Ray Knee Complete 4 or More Views Left    Result Date: 1/19/2024  EXAMINATION: XR KNEE COMP 4 OR MORE VIEWS LEFT CLINICAL HISTORY: Unspecified fall, initial encounter TECHNIQUE: Two-view COMPARISON: None available FINDINGS: There is fracture supracondylar distal femur with mild displacement better visualized on the lateral projection.  No acute fracture or dislocation about the left knee joint identified     Fracture distal femur. Electronically signed by: Robbin Kirkland Date:    01/19/2024 Time:    16:24       Assessment/Plan:     Active Diagnoses:    Diagnosis Date Noted POA    PRINCIPAL PROBLEM:  Closed  fracture of left distal femur [S72.402A] 01/20/2024 Yes    Closed nondisplaced fracture of greater trochanter of left femur with malunion [S72.115P] 01/20/2024 Yes      Problems Resolved During this Admission:       Pt has acute injury with risk of severe bodily function with their injury to the left distal femur    Independent Radiology ordered by other provider:  Three views left knee skeletally mature individual shows a distal femur fracture with intercondylar extension with displacement.    Three views left hip skeletally mature individual shows chronic appearing fragment of the greater trochanter likely avulsion type.  Not acute    CT scan of the left knee shows into intercondylar extension from the distal femur fracture with displacement    CT scan of the left hip shows chronic appearing fracture fragment without signs of acute injury.      Patient has a left injury on CT intercondylar distal femur fracture meets surgical indications for stabilization.  We discussed the surgery in detail.  We have also discussed her old injury to her left hip which may may need surgery in the future by sports surgeon if she continues to have pain.  Patient is agreeable surgery today.      I explained that surgery and the nature of their condition are not without risks. These include, but are not limited to, bleeding, infection, neurovascular compromise, malunion, nonunion, hardware complications, wound complications, scarring, cosmetic defects, need for later and/or repeated surgeries, avascular necrosis, bone death due to initial trauma, pain, loss of ROM, loss of function, PTOA, deformity, stance/gait and/or functional abnormalities, thromboembolic complications, compartment syndrome, loss of limb, loss of life, anesthetic complications, and other imponderables. I explained that these can occur despite the adequacy of treatments rendered, and that their risks are heightened given the nature of their condition. They  verbalized understanding. They would like to continue with surgery at this time. If appropriate family was involved with surgical discussion.               This note/OR report was created with the assistance of  voice recognition software or phone  dictation.  There may be transcription errors as a result of using this technology however minimal. Effort has been made to assure accuracy of transcription but any obvious errors or omissions should be clarified with the author of the document.       Kraig Saldivar, DO   Orthopedic Trauma Surgery  [facility] - Emergency Dept

## 2024-01-20 NOTE — NURSING
Nurses Note -- 4 Eyes      1/20/2024   1:02 PM      Skin assessed during: Admit      [x] No Altered Skin Integrity Present    []Prevention Measures Documented      [] Yes- Altered Skin Integrity Present or Discovered   [] LDA Added if Not in Epic (Describe Wound)   [] New Altered Skin Integrity was Present on Admit and Documented in LDA   [] Wound Image Taken    Wound Care Consulted? No    Attending Nurse:  NAYELI Mast    Second RN/Staff Member:   NAYELI Triana         
None

## 2024-01-20 NOTE — ANESTHESIA PROCEDURE NOTES
Intubation    Date/Time: 1/20/2024 9:25 AM    Performed by: Bruce Sarmiento CRNA  Authorized by: Eric Rivera Jr., MD    Intubation:     Induction:  Intravenous    Intubated:  Postinduction    Mask Ventilation:  Easy with oral airway    Attempts:  1    Attempted By:  CRNA    Method of Intubation:  Direct    Blade:  Longoria 2    Laryngeal View Grade: Grade IIA - cords partially seen      Difficult Airway Encountered?: No      Complications:  None    Airway Device:  Oral endotracheal tube    Airway Device Size:  7.0    Style/Cuff Inflation:  Cuffed (inflated to minimal occlusive pressure)    Inflation Amount (mL):  6    Tube secured:  22    Secured at:  The lips    Placement Verified By:  Capnometry    Complicating Factors:  None    Findings Post-Intubation:  BS equal bilateral

## 2024-01-21 PROBLEM — F41.9 ANXIETY: Status: ACTIVE | Noted: 2024-01-21
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
## 2024-01-21 PROBLEM — I10 HTN (HYPERTENSION): Status: ACTIVE | Noted: 2024-01-21

## 2024-01-21 LAB
ANION GAP SERPL CALC-SCNC: 3 MEQ/L
BASOPHILS # BLD AUTO: 0.02 X10(3)/MCL
BASOPHILS NFR BLD AUTO: 0.3 %
BUN SERPL-MCNC: 13 MG/DL (ref 9.8–20.1)
CALCIUM SERPL-MCNC: 8.1 MG/DL (ref 8.4–10.2)
CHLORIDE SERPL-SCNC: 100 MMOL/L (ref 98–107)
CO2 SERPL-SCNC: 30 MMOL/L (ref 23–31)
CREAT SERPL-MCNC: 0.77 MG/DL (ref 0.55–1.02)
CREAT/UREA NIT SERPL: 17
DEPRECATED CALCIDIOL+CALCIFEROL SERPL-MC: 59.8 NG/ML (ref 30–80)
EOSINOPHIL # BLD AUTO: 0.01 X10(3)/MCL (ref 0–0.9)
EOSINOPHIL NFR BLD AUTO: 0.1 %
ERYTHROCYTE [DISTWIDTH] IN BLOOD BY AUTOMATED COUNT: 12.6 % (ref 11.5–17)
GFR SERPLBLD CREATININE-BSD FMLA CKD-EPI: >60 MLS/MIN/1.73/M2
GLUCOSE SERPL-MCNC: 132 MG/DL (ref 82–115)
HCT VFR BLD AUTO: 28.1 % (ref 37–47)
HGB BLD-MCNC: 9.1 G/DL (ref 12–16)
IMM GRANULOCYTES # BLD AUTO: 0.02 X10(3)/MCL (ref 0–0.04)
IMM GRANULOCYTES NFR BLD AUTO: 0.3 %
LYMPHOCYTES # BLD AUTO: 1.04 X10(3)/MCL (ref 0.6–4.6)
LYMPHOCYTES NFR BLD AUTO: 15 %
MAGNESIUM SERPL-MCNC: 1.9 MG/DL (ref 1.6–2.6)
MCH RBC QN AUTO: 29.4 PG (ref 27–31)
MCHC RBC AUTO-ENTMCNC: 32.4 G/DL (ref 33–36)
MCV RBC AUTO: 90.9 FL (ref 80–94)
MONOCYTES # BLD AUTO: 0.54 X10(3)/MCL (ref 0.1–1.3)
MONOCYTES NFR BLD AUTO: 7.8 %
NEUTROPHILS # BLD AUTO: 5.3 X10(3)/MCL (ref 2.1–9.2)
NEUTROPHILS NFR BLD AUTO: 76.5 %
NRBC BLD AUTO-RTO: 0 %
PHOSPHATE SERPL-MCNC: 2.7 MG/DL (ref 2.3–4.7)
PLATELET # BLD AUTO: 199 X10(3)/MCL (ref 130–400)
PMV BLD AUTO: 9.7 FL (ref 7.4–10.4)
POTASSIUM SERPL-SCNC: 4.4 MMOL/L (ref 3.5–5.1)
PREALB SERPL-MCNC: 15.5 MG/DL (ref 14–37)
RBC # BLD AUTO: 3.09 X10(6)/MCL (ref 4.2–5.4)
SODIUM SERPL-SCNC: 133 MMOL/L (ref 136–145)
WBC # SPEC AUTO: 6.93 X10(3)/MCL (ref 4.5–11.5)

## 2024-01-21 PROCEDURE — 84100 ASSAY OF PHOSPHORUS: CPT

## 2024-01-21 PROCEDURE — 84134 ASSAY OF PREALBUMIN: CPT | Performed by: ORTHOPAEDIC SURGERY

## 2024-01-21 PROCEDURE — 63600175 PHARM REV CODE 636 W HCPCS

## 2024-01-21 PROCEDURE — 25000003 PHARM REV CODE 250

## 2024-01-21 PROCEDURE — 82306 VITAMIN D 25 HYDROXY: CPT | Performed by: ORTHOPAEDIC SURGERY

## 2024-01-21 PROCEDURE — 63600175 PHARM REV CODE 636 W HCPCS: Mod: JZ,JG | Performed by: PHYSICIAN ASSISTANT

## 2024-01-21 PROCEDURE — 83735 ASSAY OF MAGNESIUM: CPT

## 2024-01-21 PROCEDURE — 21400001 HC TELEMETRY ROOM

## 2024-01-21 PROCEDURE — 85025 COMPLETE CBC W/AUTO DIFF WBC: CPT

## 2024-01-21 PROCEDURE — 80048 BASIC METABOLIC PNL TOTAL CA: CPT

## 2024-01-21 RX ADMIN — ACETAMINOPHEN 650 MG: 325 TABLET, FILM COATED ORAL at 01:01

## 2024-01-21 RX ADMIN — OXYCODONE HYDROCHLORIDE 10 MG: 10 TABLET ORAL at 01:01

## 2024-01-21 RX ADMIN — ACETAMINOPHEN 650 MG: 325 TABLET, FILM COATED ORAL at 05:01

## 2024-01-21 RX ADMIN — ONDANSETRON 4 MG: 2 INJECTION INTRAMUSCULAR; INTRAVENOUS at 11:01

## 2024-01-21 RX ADMIN — AMLODIPINE BESYLATE 5 MG: 5 TABLET ORAL at 08:01

## 2024-01-21 RX ADMIN — OXYCODONE HYDROCHLORIDE 10 MG: 10 TABLET ORAL at 08:01

## 2024-01-21 RX ADMIN — OXYCODONE HYDROCHLORIDE 10 MG: 10 TABLET ORAL at 05:01

## 2024-01-21 RX ADMIN — METHOCARBAMOL 500 MG: 500 TABLET ORAL at 08:01

## 2024-01-21 RX ADMIN — Medication 6 MG: at 09:01

## 2024-01-21 RX ADMIN — ENOXAPARIN SODIUM 40 MG: 100 INJECTION SUBCUTANEOUS at 09:01

## 2024-01-21 RX ADMIN — CEFAZOLIN SODIUM 2 G: 2 SOLUTION INTRAVENOUS at 08:01

## 2024-01-21 RX ADMIN — METHOCARBAMOL 500 MG: 500 TABLET ORAL at 03:01

## 2024-01-21 RX ADMIN — POLYETHYLENE GLYCOL 3350 17 G: 17 POWDER, FOR SOLUTION ORAL at 08:01

## 2024-01-21 RX ADMIN — METHOCARBAMOL 500 MG: 500 TABLET ORAL at 09:01

## 2024-01-21 RX ADMIN — ENOXAPARIN SODIUM 40 MG: 100 INJECTION SUBCUTANEOUS at 08:01

## 2024-01-21 RX ADMIN — CITALOPRAM HYDROBROMIDE 20 MG: 20 TABLET ORAL at 09:01

## 2024-01-21 RX ADMIN — MORPHINE SULFATE 4 MG: 10 INJECTION, SOLUTION INTRAMUSCULAR; INTRAVENOUS at 11:01

## 2024-01-21 RX ADMIN — OXYCODONE HYDROCHLORIDE 10 MG: 10 TABLET ORAL at 09:01

## 2024-01-21 RX ADMIN — CEFAZOLIN SODIUM 2 G: 2 SOLUTION INTRAVENOUS at 01:01

## 2024-01-21 NOTE — PROGRESS NOTES
[facility]  Orthopedics  Progress Note    Patient Name: Nurys Joe  MRN: [id number]  Admission Date: 1/19/2024  Hospital Length of Stay: 2 days  Attending Provider: Ruben Cantu MD  Primary Care Provider: Jp Choi MD  Follow-up For: Procedure(s) (LRB):  INSERTION, INTRAMEDULLARY LAURENT, FEMUR, DISTAL, RETROGRADE (Left)    Post-Operative Day: 1 Day Post-Op  Subjective:     Principal Problem:Closed fracture of left distal femur    Principal Orthopedic Problem: 1 Day Post-Op   IMN with LAW plate/ ORIF intercondylar split left distal femur    Interval History: Doing well overall. Pain is well controlled. States she has not been up to mobilize with therapy at the time of my visit. No acute complaints overall. She is doing well.     Review of patient's allergies indicates:   Allergen Reactions    Sulfamethoxazole      Other reaction(s): unknown reaction       Current Facility-Administered Medications   Medication    acetaminophen tablet 650 mg    acetaminophen tablet 650 mg    amLODIPine tablet 5 mg    citalopram tablet 20 mg    electrolyte-A infusion    enoxaparin injection 40 mg    LIDOcaine HCL 10 mg/ml (1%) injection 1 mL    losartan tablet 50 mg    melatonin tablet 6 mg    methocarbamoL tablet 500 mg    morphine injection 4 mg    naloxone 0.4 mg/mL injection 0.4 mg    ondansetron injection 4 mg    oxyCODONE immediate release tablet 5 mg    oxyCODONE immediate release tablet Tab 10 mg    polyethylene glycol packet 17 g    scopolamine 1.3-1.5 mg (1 mg over 3 days) 1 patch    sodium chloride 0.9% flush 10 mL     Objective:     Vital Signs (Most Recent):  Temp: 98.1 °F (36.7 °C) (01/21/24 0804)  Pulse: 87 (01/21/24 0804)  Resp: 18 (01/21/24 0811)  BP: 109/70 (01/21/24 0804)  SpO2: 98 % (01/21/24 0804) Vital Signs (24h Range):  Temp:  [98.1 °F (36.7 °C)-98.3 °F (36.8 °C)] 98.1 °F (36.7 °C)  Pulse:  [72-88] 87  Resp:  [12-21] 18  SpO2:  [84 %-100 %] 98 %  BP: (104-132)/(60-78) 109/70  "    Weight: 65.8 kg (145 lb)  Height: 5' 5.98" (167.6 cm)  Body mass index is 23.41 kg/m².      Intake/Output Summary (Last 24 hours) at 1/21/2024 1029  Last data filed at 1/21/2024 0830  Gross per 24 hour   Intake 3618.54 ml   Output 380 ml   Net 3238.54 ml       Physical Exam:   General the patient is alert and oriented x3 no acute distress nontoxic-appearing appropriate affect.    Constitutional: Vital signs are examined and stable.  Resp: No signs of labored breathing               LLE: -Skin: Dressing CDI, No signs of new abrasions or lacerations, no scars           -MSK: Hip and Knee F/E, EHL/FHL, Gastroc/Tib anterior Strength 5/5; Tolerates full gentle passive ROM at the knee           -Neuro:  Sensation intact to light touch L3-S1 dermatomes           -Lymphatic: No signs of lymphadenopathy           -CV: Capillary refill is less than 2 seconds. DP/PT pulses 2/4. Compartments soft and compressible                              Diagnostic Findings:   Significant Labs:   Recent Lab Results         01/21/24  0543   01/20/24  0408   01/19/24  1809   01/19/24  1708        Albumin/Globulin Ratio       1.8       ABO and RH     A POS         Albumin       4.3       ALP       63       ALT       18       Anion Gap 3.0   7.0           AST       19       Baso # 0.02   0.04     0.05       Basophil % 0.3   0.5     0.4       BILIRUBIN TOTAL       0.3       BUN 13.0   15.5     17.6       BUN/CREAT RATIO 17   18           Calcium 8.1   8.9     9.8       Chloride 100   104     105       CO2 30   28     25       Creatinine 0.77   0.84     1.12       eGFR >60   >60     55       Eos # 0.01   0.09     0.03       Eosinophil % 0.1   1.1     0.2       Globulin, Total       2.4       Glucose 132   121     116       Group & Rh       A POS       Hematocrit 28.1   35.9     37.8       Hemoglobin 9.1   11.7     12.6       Immature Grans (Abs) 0.02   0.03     0.02       Immature Granulocytes 0.3   0.4     0.2       Indirect Brenda GEL       " NEG       INR       1.0       Lymph # 1.04   1.05     0.65       LYMPH % 15.0   13.0     5.3       Magnesium  1.90   2.00           MCH 29.4   29.0     29.6       MCHC 32.4   32.6     33.3       MCV 90.9   89.1     88.7       Mono # 0.54   0.51     0.30       Mono % 7.8   6.3     2.5       MPV 9.7   9.7     9.4       Neut # 5.30   6.33     11.12       Neut % 76.5   78.7     91.4       nRBC 0.0   0.0     0.0       Phosphorus Level 2.7   4.0           Platelet Count 199   203     240       Potassium 4.4   4.6     4.4       Prealbumin 15.5             PROTEIN TOTAL       6.7       Protime       13.1       RBC 3.09   4.03     4.26       RDW 12.6   12.4     12.3       Sodium 133   139     140       Specimen Outdate       01/22/2024 23:59       WBC 6.93   8.05     12.17                Significant Imaging: I have reviewed and interpreted all pertinent imaging results/findings.     Assessment/Plan:     Active Diagnoses:    Diagnosis Date Noted POA    PRINCIPAL PROBLEM:  Closed fracture of left distal femur [S72.402A] 01/20/2024 Yes    HTN (hypertension) [I10] 01/21/2024 Yes    Anxiety [F41.9] 01/21/2024 Yes    Fall [W19.XXXA] 01/21/2024 Yes    Closed nondisplaced fracture of greater trochanter of left femur with malunion [S72.115P] 01/20/2024 Yes      Problems Resolved During this Admission:   H&H down slightly consistent with acute post operative anemia. We will continue to monitor stability over 24 hours.   TRICIA SANTANA. Work with therapy today. Likely rehab placement. Lovenox for DVT ppx during stay and x 30 days at discharge.   Continue multimodal pain control. Daily dry dressing changes beginning tomorrow as needed.   She will need follow up in our office in 3 weeks for wound check and repeat x rays. Will continue to monitor through her stay. Please call with any questions or concerns.      The above findings, diagnostics, and treatment plan were discussed with Dr. Saldivar who is in agreement with the plan of care  except as stated in additional documentation.      Maru Nuno PA-C  Orthopedic Trauma Surgery  [facility]

## 2024-01-21 NOTE — TERTIARY TRAUMA SURVEY NOTE
TERTIARY TRAUMA SURVEY (TTS)    List Injuries Identified to Date:   Oblique,displaced fracture of distal femoral metaphysis that extends inferiorly into the intracondylar region    [x]Problems list reviewed  List Operations and Procedures:   ORIF left distal femur fracture with intracondylar split (1/20/24)    Past Surgical History:   Procedure Laterality Date    COLONOSCOPY  04/2019     Incidental findings:   None    Past Medical History:   HTN  Anxiety    Active Ambulatory Problems     Diagnosis Date Noted    No Active Ambulatory Problems     Resolved Ambulatory Problems     Diagnosis Date Noted    No Resolved Ambulatory Problems     Past Medical History:   Diagnosis Date    Anxiety     Hypertension      Past Medical History:   Diagnosis Date    Anxiety     Hypertension        Tertiary Physical Exam:     Physical Exam  Vitals reviewed.   Constitutional:       General: She is not in acute distress.     Appearance: Normal appearance. She is normal weight. She is not toxic-appearing.   HENT:      Head: Normocephalic and atraumatic.      Nose: Nose normal.      Mouth/Throat:      Mouth: Mucous membranes are moist.      Pharynx: Oropharynx is clear.   Eyes:      Pupils: Pupils are equal, round, and reactive to light.   Cardiovascular:      Rate and Rhythm: Normal rate.      Pulses: Normal pulses.   Pulmonary:      Effort: Pulmonary effort is normal.   Abdominal:      General: Abdomen is flat. There is no distension.      Palpations: Abdomen is soft.      Tenderness: There is no abdominal tenderness. There is no guarding.   Musculoskeletal:      Cervical back: Normal range of motion.      Right lower leg: Normal.      Left lower leg: Tenderness present.      Comments: Post-op dressing with ACE bandage in place. 2+ DP plulse intact.    Skin:     General: Skin is warm and dry.      Capillary Refill: Capillary refill takes less than 2 seconds.   Neurological:      General: No focal deficit present.      Mental Status:  She is alert and oriented to person, place, and time.      Sensory: No sensory deficit.      Motor: No weakness.   Psychiatric:         Mood and Affect: Mood normal.         Thought Content: Thought content normal.         Judgment: Judgment normal.         Imaging Review:     Imaging Results              CT 3D RECON WITHOUT INDEPENDENT WS (Final result)  Result time 01/19/24 19:51:23      Final result by Robbin Kirkland MD (01/19/24 19:51:23)                   Narrative:    EXAMINATION:  CT 3D WITHOUT INDEPENDENT WS    CLINICAL HISTORY:  left knee CT surgical planning;      Electronically signed by: Robbin Krikland  Date:    01/19/2024  Time:    19:51                                     CT Hip Without Contrast Left (Final result)  Result time 01/19/24 17:11:39      Final result by Juan Keys MD (01/19/24 17:11:39)                   Impression:      No evidence of acute fracture    Likely old avulsion injury at the tip of the greater trochanter      Electronically signed by: Len Keys  Date:    01/19/2024  Time:    17:11               Narrative:    EXAMINATION:  CT HIP WITHOUT CONTRAST LEFT    CLINICAL HISTORY:  Hip trauma, fracture suspected, xray done;    TECHNIQUE:  Multiple axial images were obtained of the left hip and sagittal and coronal reconstructions were performed.  Automatic exposure control (AEC) is utilized to reduce patient radiation exposure.    COMPARISON:  Plain film dated 01/19/2024    FINDINGS:  The bones and joints are in good anatomic alignment. There is a chronic appearing avulsion type injury at the tip of the greater trochanter. No convincing evidence of acute fracture is seen.  No dislocation is seen.  No soft tissue abnormality seen. No hematoma is seen. No soft tissue swelling seen.                                       CT Knee Without Contrast Left (Final result)  Result time 01/19/24 17:19:39      Final result by Juan Keys MD (01/19/24 17:19:39)                    Impression:      Fracture of the distal femur as outlined above      Electronically signed by: Len Keys  Date:    01/19/2024  Time:    17:19               Narrative:    EXAMINATION:  CT KNEE WITHOUT CONTRAST LEFT    CLINICAL HISTORY:  Fracture, knee;    TECHNIQUE:  Multiple axial images were obtained of the left knee and sagittal and coronal reconstructions were performed.    COMPARISON:  Plain film dated 01/19/2024    FINDINGS:  There is an obliquely oriented fracture of the distal femoral metaphysis.  The fracture extends inferiorly into the intracondylar region.  There is some displacement seen.  The fracture terminates at the lateral femoral condyle at the level the condylar notch.  No tibia fracture is seen.  No fibula fracture is seen.  The patella is intact.  There is a suprapatellar bursa effusion.                                       X-Ray Chest AP Portable (Final result)  Result time 01/19/24 16:43:58      Final result by Vincent Ruby MD (01/19/24 16:43:58)                   Impression:      No acute pulmonary process identified.      Electronically signed by: Vincent Ruby  Date:    01/19/2024  Time:    16:43               Narrative:    EXAMINATION:  XR CHEST AP PORTABLE    CLINICAL HISTORY:  pre-op;    TECHNIQUE:  Frontal view(s) of the chest.    COMPARISON:  No relevant comparison studies available at the time of dictation.    FINDINGS:  Normal cardiac silhouette.  The lungs are well-inflated.  No consolidation identified.  No significant pleural effusion or discernible pneumothorax.                                       X-Ray Hip 2 or 3 views Left (with Pelvis when performed) (Final result)  Result time 01/19/24 16:25:38      Final result by Robbin Kirkland MD (01/19/24 16:25:38)                   Impression:      No acute osseous abnormality identified.      Electronically signed by: Robbin Kirkland  Date:    01/19/2024  Time:    16:25               Narrative:    EXAMINATION:  XR HIP  WITH PELVIS WHEN PERFORMED, 2 OR 3 VIEWS LEFT    CLINICAL HISTORY:  Four.    TECHNIQUE:  Three views    COMPARISON:  None available    FINDINGS:  Articular surfaces are unremarkable and there is no intrinsic osseous abnormality.  No acute or dislocation.  There are mild degenerative arthritic changes.  There is calcification adjacent to the greater trochanter.                                       X-Ray Knee Complete 4 or More Views Left (Final result)  Result time 01/19/24 16:24:20      Final result by Robbin Kirkland MD (01/19/24 16:24:20)                   Impression:      Fracture distal femur.      Electronically signed by: Robbin Kirkland  Date:    01/19/2024  Time:    16:24               Narrative:    EXAMINATION:  XR KNEE COMP 4 OR MORE VIEWS LEFT    CLINICAL HISTORY:  Unspecified fall, initial encounter    TECHNIQUE:  Two-view    COMPARISON:  None available    FINDINGS:  There is fracture supracondylar distal femur with mild displacement better visualized on the lateral projection.  No acute fracture or dislocation about the left knee joint identified                                       Lab Review:   CBC:  Recent Labs   Lab Result Units 01/19/24 1708 01/20/24  0408 01/21/24  0543   WBC x10(3)/mcL 12.17* 8.05 6.93   RBC x10(6)/mcL 4.26 4.03* 3.09*   Hgb g/dL 12.6 11.7* 9.1*   Hct % 37.8 35.9* 28.1*   Platelet x10(3)/mcL 240 203 199   MCV fL 88.7 89.1 90.9   MCH pg 29.6 29.0 29.4   MCHC g/dL 33.3 32.6* 32.4*       CMP:  Recent Labs   Lab Result Units 01/19/24 1708 01/20/24  0408 01/21/24  0543   Calcium Level Total mg/dL 9.8   < > 8.1*   Albumin Level g/dL 4.3  --   --    Sodium Level mmol/L 140   < > 133*   Potassium Level mmol/L 4.4   < > 4.4   Carbon Dioxide mmol/L 25   < > 30   Blood Urea Nitrogen mg/dL 17.6   < > 13.0   Creatinine mg/dL 1.12*   < > 0.77   Alkaline Phosphatase unit/L 63  --   --    Alanine Aminotransferase unit/L 18  --   --    Aspartate Aminotransferase unit/L 19  --   --    Bilirubin  "Total mg/dL 0.3  --   --     < > = values in this interval not displayed.       Troponin:  No results for input(s): "TROPONINI" in the last 2160 hours.    ETOH:  No results for input(s): "ETHANOL" in the last 72 hours.     Urine Drug Screen:  No results for input(s): "COCAINE", "OPIATE", "BARBITURATE", "AMPHETAMINE", "FENTANYL", "CANNABINOIDS", "MDMA" in the last 72 hours.    Invalid input(s): "BENZODIAZEPINE", "PHENCYCLIDINE"     Plan:   Closed distal left femur fracture  - s/p ORIF L distal femur with intracondylar split (1/20)  - MMPC  - PT/OT  - Fall precautions      Acute blood loss anemia  - H&H down following OR yesterday  - Trend AM CBC     HTN  - Continue home antihypertensives      Anxiety  - Continue home citalopram      Fall  - MMPC  - Lovenox 40mg q12h for VTE ppx  - Fall and standard precautions   - Hourly IS while awake  - Continue working with PT/OT, planned dispo home with family     Bozena Kingston, AGACNP-BC, FNP-BC  Trauma Surgery  [facility]  C: [phone]  "

## 2024-01-21 NOTE — PT/OT/SLP PROGRESS
Occupational Therapy      Patient Name:  Nurys Joe   MRN:  [id number]    Patient not seen today secondary to Pain. Pt reporting 10/10 pain; RN reporting that she just administered morphine. Will follow-up tomorrow.    1/21/2024

## 2024-01-21 NOTE — PROGRESS NOTES
"   Trauma Surgery   Progress Note  Admit Date: 1/19/2024  HD#2  POD#1 Day Post-Op    Subjective:   Interval history:  Afebrile, VSS. Patient reports overall good pain control. H&H down after surgery. Tolerated therapy decently, reported some wooziness when standing. Would like to discharge home.     Home Meds:   Current Outpatient Medications   Medication Instructions    amLODIPine (NORVASC) 5 mg, Oral    citalopram (CELEXA) 40 mg, Oral    conjugated estrogens (PREMARIN) vaginal cream   See Instructions, 0.5gms vaginally q hs x 14 then 2-3x/week, # 30 gm, 6 Refill(s), Pharmacy: [facility], 168, cm, Height/Length Dosing, 08/18/22 10:16:00 CDT, 65.6, kg, Weight Dosing, 08/18/22 10:15:00 CDT    conjugated estrogens (PREMARIN) 0.5 g, Vaginal, Daily    losartan (COZAAR) 50 mg, Oral      Scheduled Meds:   amLODIPine  5 mg Oral Daily    ceFAZolin (Ancef) IV (PEDS and ADULTS)  2 g Intravenous Q8H    citalopram  20 mg Oral QHS    enoxparin  40 mg Subcutaneous Q12H (prophylaxis, 0900/2100)    losartan  50 mg Oral Daily    methocarbamoL  500 mg Oral TID    polyethylene glycol  17 g Oral Daily    scopolamine  1 patch Transdermal Once     Continuous Infusions:   electrolyte-A       PRN Meds:acetaminophen, acetaminophen, LIDOcaine HCL 10 mg/ml (1%), melatonin, morphine, naloxone, ondansetron, oxyCODONE, oxyCODONE, sodium chloride 0.9%     Objective:     VITAL SIGNS: 24 HR MIN & MAX LAST   Temp  Min: 98.2 °F (36.8 °C)  Max: 98.3 °F (36.8 °C)  98.3 °F (36.8 °C)   BP  Min: 104/65  Max: 138/85  105/65    Pulse  Min: 72  Max: 88  78    Resp  Min: 12  Max: 21  18    SpO2  Min: 84 %  Max: 100 %  95 %      HT: 5' 5.98" (167.6 cm)  WT: 65.8 kg (145 lb)  BMI: 23.4     Intake/output:  Intake/Output - Last 3 Shifts         01/19 0700 01/20 0659 01/20 0700 01/21 0659 01/21 0700 01/22 0659    P.O.  360     I.V. (mL/kg)  2800 (42.6)     IV Piggyback  98.5     Total Intake(mL/kg)  3258.5 (49.5)     Blood  80     Total Output " " 80     Net  +3178.5            Urine Occurrence  1 x             Intake/Output Summary (Last 24 hours) at 1/21/2024 0745  Last data filed at 1/21/2024 0643  Gross per 24 hour   Intake 3258.54 ml   Output 80 ml   Net 3178.54 ml           Lines/drains/airway:       Peripheral IV - Single Lumen 01/19/24 1530 20 G Right Antecubital (Active)   Site Assessment Clean;Dry;Intact;No redness;No swelling 01/20/24 1103   Dressing Status Clean;Dry;Intact 01/20/24 1103   Number of days: 0       Physical examination:  Gen: NAD, AAOx3, answering questions appropriately  HEENT: Normocephalic, atraumatic  CV: RR, 2+ DPs bilaterally  Resp: NWOB  Abd: S/NT/ND  Msk: moving all extremities spontaneously and purposefully. Pain with movement of LLE.   Neuro: CN II-XII grossly intact  Skin/wounds: Warm, dry, intact.Surgical dressing with ACE in place to LLE.    Labs:  Renal:  Recent Labs     01/19/24 1708 01/20/24 0408 01/21/24  0543   BUN 17.6 15.5 13.0   CREATININE 1.12* 0.84 0.77       No results for input(s): "LACTIC" in the last 72 hours.  FEN/GI:  Recent Labs     01/19/24 1708 01/20/24 0408 01/21/24  0543    139 133*   K 4.4 4.6 4.4   CO2 25 28 30   CALCIUM 9.8 8.9 8.1*   MG  --  2.00 1.90   PHOS  --  4.0 2.7   ALBUMIN 4.3  --   --    BILITOT 0.3  --   --    AST 19  --   --    ALKPHOS 63  --   --    ALT 18  --   --        Heme:  Recent Labs     01/19/24 1708 01/20/24 0408 01/21/24  0543   HGB 12.6 11.7* 9.1*   HCT 37.8 35.9* 28.1*    203 199   INR 1.0  --   --        ID:  Recent Labs     01/19/24 1708 01/20/24 0408 01/21/24  0543   WBC 12.17* 8.05 6.93       CBG:  Recent Labs     01/19/24 1708 01/20/24  0408 01/21/24  0543   GLUCOSE 116* 121* 132*        No results for input(s): "POCTGLUCOSE" in the last 72 hours.   Cardiovascular:  No results for input(s): "TROPONINI", "CKTOTAL", "CKMB", "BNP" in the last 168 hours.  I have reviewed all pertinent lab results within the past 24 hours.    Imaging:  X-Ray Femur 2 " View Left   Final Result      Expected postoperative changes following intramedullary lisbeth fixation of the left femur.         Electronically signed by: Sotero Kinney MD   Date:    01/20/2024   Time:    11:24      SURG FL Surgery Fluoro Usage   Final Result      CT 3D RECON WITHOUT INDEPENDENT WS   Final Result      CT Hip Without Contrast Left   Final Result      No evidence of acute fracture      Likely old avulsion injury at the tip of the greater trochanter         Electronically signed by: Len Keys   Date:    01/19/2024   Time:    17:11      CT Knee Without Contrast Left   Final Result      Fracture of the distal femur as outlined above         Electronically signed by: Len Keys   Date:    01/19/2024   Time:    17:19      X-Ray Chest AP Portable   Final Result      No acute pulmonary process identified.         Electronically signed by: Vincent Ruby   Date:    01/19/2024   Time:    16:43      X-Ray Hip 2 or 3 views Left (with Pelvis when performed)   Final Result      No acute osseous abnormality identified.         Electronically signed by: Robbin Kirkland   Date:    01/19/2024   Time:    16:25      X-Ray Knee Complete 4 or More Views Left   Final Result      Fracture distal femur.         Electronically signed by: Robbin Kirkland   Date:    01/19/2024   Time:    16:24         I have reviewed all pertinent imaging results/findings within the past 24 hours.    Micro/Path/Other:  Microbiology Results (last 7 days)       ** No results found for the last 168 hours. **           Specimen (168h ago, onward)      None             Problems list:  Active Problem List with Overview Notes    Diagnosis Date Noted    Closed fracture of left distal femur 01/20/2024    Closed nondisplaced fracture of greater trochanter of left femur with malunion 01/20/2024        Assessment & Plan:     Closed distal left femur fracture  - s/p ORIF L distal femur with intracondylar split (1/20)  - MMPC  - PT/OT  - Fall precautions     Acute  blood loss anemia  - H&H down following OR yesterday  - Trend AM CBC    HTN  - Continue home antihypertensives     Anxiety  - Continue home citalopram     Fall  - MMPC  - Lovenox 40mg q12h for VTE ppx  - Fall and standard precautions   - Hourly IS while awake  - Continue working with PT/OT, planned dispo home with family    Bozena Kingston, AGAHAMZAHP-BC, FNP-BC  Trauma Surgery  [facility]  C: [phone]

## 2024-01-22 PROBLEM — D62 ACUTE BLOOD LOSS ANEMIA: Status: ACTIVE | Noted: 2024-01-22

## 2024-01-22 LAB
ANION GAP SERPL CALC-SCNC: 3 MEQ/L
BASOPHILS # BLD AUTO: 0.04 X10(3)/MCL
BASOPHILS NFR BLD AUTO: 0.6 %
BUN SERPL-MCNC: 7.9 MG/DL (ref 9.8–20.1)
CALCIUM SERPL-MCNC: 8.3 MG/DL (ref 8.4–10.2)
CHLORIDE SERPL-SCNC: 102 MMOL/L (ref 98–107)
CO2 SERPL-SCNC: 30 MMOL/L (ref 23–31)
CREAT SERPL-MCNC: 0.68 MG/DL (ref 0.55–1.02)
CREAT/UREA NIT SERPL: 12
EOSINOPHIL # BLD AUTO: 0.05 X10(3)/MCL (ref 0–0.9)
EOSINOPHIL NFR BLD AUTO: 0.8 %
ERYTHROCYTE [DISTWIDTH] IN BLOOD BY AUTOMATED COUNT: 12.4 % (ref 11.5–17)
GFR SERPLBLD CREATININE-BSD FMLA CKD-EPI: >60 MLS/MIN/1.73/M2
GLUCOSE SERPL-MCNC: 119 MG/DL (ref 82–115)
HCT VFR BLD AUTO: 25.1 % (ref 37–47)
HGB BLD-MCNC: 8.2 G/DL (ref 12–16)
IMM GRANULOCYTES # BLD AUTO: 0.02 X10(3)/MCL (ref 0–0.04)
IMM GRANULOCYTES NFR BLD AUTO: 0.3 %
LYMPHOCYTES # BLD AUTO: 0.86 X10(3)/MCL (ref 0.6–4.6)
LYMPHOCYTES NFR BLD AUTO: 13.9 %
MAGNESIUM SERPL-MCNC: 1.9 MG/DL (ref 1.6–2.6)
MCH RBC QN AUTO: 29.2 PG (ref 27–31)
MCHC RBC AUTO-ENTMCNC: 32.7 G/DL (ref 33–36)
MCV RBC AUTO: 89.3 FL (ref 80–94)
MONOCYTES # BLD AUTO: 0.51 X10(3)/MCL (ref 0.1–1.3)
MONOCYTES NFR BLD AUTO: 8.2 %
NEUTROPHILS # BLD AUTO: 4.72 X10(3)/MCL (ref 2.1–9.2)
NEUTROPHILS NFR BLD AUTO: 76.2 %
NRBC BLD AUTO-RTO: 0 %
PHOSPHATE SERPL-MCNC: 2.8 MG/DL (ref 2.3–4.7)
PLATELET # BLD AUTO: 190 X10(3)/MCL (ref 130–400)
PMV BLD AUTO: 9.5 FL (ref 7.4–10.4)
POTASSIUM SERPL-SCNC: 4.2 MMOL/L (ref 3.5–5.1)
RBC # BLD AUTO: 2.81 X10(6)/MCL (ref 4.2–5.4)
SODIUM SERPL-SCNC: 135 MMOL/L (ref 136–145)
WBC # SPEC AUTO: 6.2 X10(3)/MCL (ref 4.5–11.5)

## 2024-01-22 PROCEDURE — 80048 BASIC METABOLIC PNL TOTAL CA: CPT

## 2024-01-22 PROCEDURE — 25000003 PHARM REV CODE 250

## 2024-01-22 PROCEDURE — 84100 ASSAY OF PHOSPHORUS: CPT

## 2024-01-22 PROCEDURE — 85025 COMPLETE CBC W/AUTO DIFF WBC: CPT

## 2024-01-22 PROCEDURE — 63600175 PHARM REV CODE 636 W HCPCS

## 2024-01-22 PROCEDURE — 21400001 HC TELEMETRY ROOM

## 2024-01-22 PROCEDURE — 25000003 PHARM REV CODE 250: Performed by: NURSE PRACTITIONER

## 2024-01-22 PROCEDURE — 83735 ASSAY OF MAGNESIUM: CPT

## 2024-01-22 PROCEDURE — 97166 OT EVAL MOD COMPLEX 45 MIN: CPT

## 2024-01-22 PROCEDURE — 97116 GAIT TRAINING THERAPY: CPT | Mod: CQ

## 2024-01-22 RX ORDER — AMOXICILLIN 250 MG
1 CAPSULE ORAL 2 TIMES DAILY PRN
Status: DISCONTINUED | OUTPATIENT
Start: 2024-01-22 | End: 2024-01-24 | Stop reason: HOSPADM

## 2024-01-22 RX ORDER — POLYETHYLENE GLYCOL 3350 17 G/17G
17 POWDER, FOR SOLUTION ORAL 2 TIMES DAILY
Status: DISCONTINUED | OUTPATIENT
Start: 2024-01-22 | End: 2024-01-24 | Stop reason: HOSPADM

## 2024-01-22 RX ADMIN — OXYCODONE HYDROCHLORIDE 10 MG: 10 TABLET ORAL at 05:01

## 2024-01-22 RX ADMIN — ACETAMINOPHEN 650 MG: 325 TABLET, FILM COATED ORAL at 02:01

## 2024-01-22 RX ADMIN — SODIUM CHLORIDE 1000 ML: 9 INJECTION, SOLUTION INTRAVENOUS at 11:01

## 2024-01-22 RX ADMIN — METHOCARBAMOL 500 MG: 500 TABLET ORAL at 09:01

## 2024-01-22 RX ADMIN — ENOXAPARIN SODIUM 40 MG: 100 INJECTION SUBCUTANEOUS at 09:01

## 2024-01-22 RX ADMIN — OXYCODONE HYDROCHLORIDE 10 MG: 10 TABLET ORAL at 01:01

## 2024-01-22 RX ADMIN — METHOCARBAMOL 500 MG: 500 TABLET ORAL at 02:01

## 2024-01-22 RX ADMIN — Medication 6 MG: at 09:01

## 2024-01-22 RX ADMIN — AMLODIPINE BESYLATE 5 MG: 5 TABLET ORAL at 09:01

## 2024-01-22 RX ADMIN — OXYCODONE HYDROCHLORIDE 10 MG: 10 TABLET ORAL at 09:01

## 2024-01-22 RX ADMIN — LOSARTAN POTASSIUM 50 MG: 50 TABLET, FILM COATED ORAL at 09:01

## 2024-01-22 RX ADMIN — POLYETHYLENE GLYCOL 3350 17 G: 17 POWDER, FOR SOLUTION ORAL at 09:01

## 2024-01-22 RX ADMIN — CITALOPRAM HYDROBROMIDE 20 MG: 20 TABLET ORAL at 09:01

## 2024-01-22 RX ADMIN — ACETAMINOPHEN 650 MG: 325 TABLET, FILM COATED ORAL at 09:01

## 2024-01-22 NOTE — PROGRESS NOTES
[facility]  Orthopedics  Progress Note    Patient Name: Nurys Joe  MRN: [id number]  Admission Date: 1/19/2024  Hospital Length of Stay: 3 days  Attending Provider: Ruben Cantu MD  Primary Care Provider: Jp Choi MD  Follow-up For: Procedure(s) (LRB):  INSERTION, INTRAMEDULLARY LAURENT, FEMUR, DISTAL, RETROGRADE (Left)    Post-Operative Day: 2 Day Post-Op  Subjective:     Principal Problem:Closed fracture of left distal femur    Principal Orthopedic Problem: 2 Days Post-Op   IMN with LAW plate/ ORIF intercondylar split left distal femur    Interval History: Doing well overall. Pain is well controlled. She was not able to do much with therapy yesterday due to pain. Will try today. Would like to try crutches instead of walker. No acute complaints overall. She is doing well.     Review of patient's allergies indicates:   Allergen Reactions    Sulfamethoxazole      Other reaction(s): unknown reaction       Current Facility-Administered Medications   Medication    acetaminophen tablet 650 mg    acetaminophen tablet 650 mg    amLODIPine tablet 5 mg    citalopram tablet 20 mg    electrolyte-A infusion    enoxaparin injection 40 mg    LIDOcaine HCL 10 mg/ml (1%) injection 1 mL    losartan tablet 50 mg    melatonin tablet 6 mg    methocarbamoL tablet 500 mg    morphine injection 4 mg    naloxone 0.4 mg/mL injection 0.4 mg    ondansetron injection 4 mg    oxyCODONE immediate release tablet 5 mg    oxyCODONE immediate release tablet Tab 10 mg    polyethylene glycol packet 17 g    scopolamine 1.3-1.5 mg (1 mg over 3 days) 1 patch    sodium chloride 0.9% flush 10 mL     Objective:     Vital Signs (Most Recent):  Temp: 98.5 °F (36.9 °C) (01/22/24 0700)  Pulse: 80 (01/22/24 0700)  Resp: 18 (01/22/24 0700)  BP: 110/78 (01/22/24 0700)  SpO2: 98 % (01/22/24 0700) Vital Signs (24h Range):  Temp:  [97.6 °F (36.4 °C)-99.4 °F (37.4 °C)] 98.5 °F (36.9 °C)  Pulse:  [74-87] 80  Resp:  [18-20] 18  SpO2:   "[95 %-98 %] 98 %  BP: (101-142)/(64-85) 110/78     Weight: 65.8 kg (145 lb)  Height: 5' 5.98" (167.6 cm)  Body mass index is 23.41 kg/m².      Intake/Output Summary (Last 24 hours) at 1/22/2024 0803  Last data filed at 1/22/2024 0740  Gross per 24 hour   Intake 600 ml   Output 1200 ml   Net -600 ml         Physical Exam:   General the patient is alert and oriented x3 no acute distress nontoxic-appearing appropriate affect.    Constitutional: Vital signs are examined and stable.  Resp: No signs of labored breathing               LLE: -Skin: Dressing CDI, No signs of new abrasions or lacerations, no scars           -MSK: Hip and Knee F/E, EHL/FHL, Gastroc/Tib anterior Strength 5/5; Tolerates full gentle passive ROM at the knee           -Neuro:  Sensation intact to light touch L3-S1 dermatomes           -Lymphatic: No signs of lymphadenopathy           -CV: Capillary refill is less than 2 seconds. DP/PT pulses 2/4. Compartments soft and compressible                              Diagnostic Findings:   Significant Labs:   Recent Lab Results         01/21/24  0543   01/20/24  0408   01/19/24  1809   01/19/24  1708        Albumin/Globulin Ratio       1.8       ABO and RH     A POS         Albumin       4.3       ALP       63       ALT       18       Anion Gap 3.0   7.0           AST       19       Baso # 0.02   0.04     0.05       Basophil % 0.3   0.5     0.4       BILIRUBIN TOTAL       0.3       BUN 13.0   15.5     17.6       BUN/CREAT RATIO 17   18           Calcium 8.1   8.9     9.8       Chloride 100   104     105       CO2 30   28     25       Creatinine 0.77   0.84     1.12       eGFR >60   >60     55       Eos # 0.01   0.09     0.03       Eosinophil % 0.1   1.1     0.2       Globulin, Total       2.4       Glucose 132   121     116       Group & Rh       A POS       Hematocrit 28.1   35.9     37.8       Hemoglobin 9.1   11.7     12.6       Immature Grans (Abs) 0.02   0.03     0.02       Immature Granulocytes 0.3   " 0.4     0.2       Indirect Brenda GEL       NEG       INR       1.0       Lymph # 1.04   1.05     0.65       LYMPH % 15.0   13.0     5.3       Magnesium  1.90   2.00           MCH 29.4   29.0     29.6       MCHC 32.4   32.6     33.3       MCV 90.9   89.1     88.7       Mono # 0.54   0.51     0.30       Mono % 7.8   6.3     2.5       MPV 9.7   9.7     9.4       Neut # 5.30   6.33     11.12       Neut % 76.5   78.7     91.4       nRBC 0.0   0.0     0.0       Phosphorus Level 2.7   4.0           Platelet Count 199   203     240       Potassium 4.4   4.6     4.4       Prealbumin 15.5             PROTEIN TOTAL       6.7       Protime       13.1       RBC 3.09   4.03     4.26       RDW 12.6   12.4     12.3       Sodium 133   139     140       Specimen Outdate       01/22/2024 23:59       Vit D, 25-Hydroxy 59.8             WBC 6.93   8.05     12.17                Significant Imaging: I have reviewed and interpreted all pertinent imaging results/findings.     Assessment/Plan:     Active Diagnoses:    Diagnosis Date Noted POA    PRINCIPAL PROBLEM:  Closed fracture of left distal femur [S72.402A] 01/20/2024 Yes    HTN (hypertension) [I10] 01/21/2024 Yes    Anxiety [F41.9] 01/21/2024 Yes    Fall [W19.XXXA] 01/21/2024 Yes    Closed nondisplaced fracture of greater trochanter of left femur with malunion [S72.115P] 01/20/2024 Yes      Problems Resolved During this Admission:   H&H down slightly but stable. We will continue to monitor stability over 24 hours.   NWTYLER LLE. ROMAT. Work with therapy today. Likely rehab placement vs home with HH. Lovenox for DVT ppx during stay and x 30 days at discharge.   Continue multimodal pain control. Daily dry dressing changes today   She will need follow up in our office in 3 weeks for wound check and repeat x rays. Will continue to monitor through her stay. Please call with any questions or concerns.  Ortho stable for DC if she mobilizes well today.      The above findings, diagnostics, and  treatment plan were discussed with Dr. Saldivar who is in agreement with the plan of care except as stated in additional documentation.      Maru Nuno PA-C  Orthopedic Trauma Surgery  [facility]

## 2024-01-22 NOTE — PROGRESS NOTES
"   Trauma Surgery   Progress Note  Admit Date: 1/19/2024  HD#3  POD#2 Days Post-Op    Subjective:   Interval history:  Afebrile, VSS. Reports did not work with therapy yesterday due to pain. Patient reports overall good pain control overnight. Ready to work with therapy today, motivated to go home.     Home Meds:   Current Outpatient Medications   Medication Instructions    amLODIPine (NORVASC) 5 mg, Oral    citalopram (CELEXA) 40 mg, Oral    conjugated estrogens (PREMARIN) vaginal cream   See Instructions, 0.5gms vaginally q hs x 14 then 2-3x/week, # 30 gm, 6 Refill(s), Pharmacy: [facility], 168, cm, Height/Length Dosing, 08/18/22 10:16:00 CDT, 65.6, kg, Weight Dosing, 08/18/22 10:15:00 CDT    conjugated estrogens (PREMARIN) 0.5 g, Vaginal, Daily    losartan (COZAAR) 50 mg, Oral      Scheduled Meds:   amLODIPine  5 mg Oral Daily    citalopram  20 mg Oral QHS    enoxparin  40 mg Subcutaneous Q12H (prophylaxis, 0900/2100)    losartan  50 mg Oral Daily    methocarbamoL  500 mg Oral TID    polyethylene glycol  17 g Oral Daily    scopolamine  1 patch Transdermal Once     Continuous Infusions:   electrolyte-A       PRN Meds:acetaminophen, acetaminophen, LIDOcaine HCL 10 mg/ml (1%), melatonin, morphine, naloxone, ondansetron, oxyCODONE, oxyCODONE, sodium chloride 0.9%     Objective:     VITAL SIGNS: 24 HR MIN & MAX LAST   Temp  Min: 97.6 °F (36.4 °C)  Max: 98.7 °F (37.1 °C)  97.9 °F (36.6 °C)   BP  Min: 101/64  Max: 136/76  110/78    Pulse  Min: 77  Max: 98  98    Resp  Min: 18  Max: 20  18    SpO2  Min: 96 %  Max: 100 %  100 %      HT: 5' 5.98" (167.6 cm)  WT: 65.8 kg (145 lb)  BMI: 23.4     Intake/output:  Intake/Output - Last 3 Shifts         01/20 0700 01/21 0659 01/21 0700 01/22 0659 01/22 0700 01/23 0659    P.O. 360 600     I.V. (mL/kg) 2800 (42.6)      IV Piggyback 98.5      Total Intake(mL/kg) 3258.5 (49.5) 600 (9.1)     Urine (mL/kg/hr)  800 (0.5) 700 (1.9)    Stool  0     Blood 80      Total " "Output 80 800 700    Net +3178.5 -200 -700           Urine Occurrence 1 x 3 x     Stool Occurrence  1 x             Intake/Output Summary (Last 24 hours) at 1/22/2024 1240  Last data filed at 1/22/2024 0740  Gross per 24 hour   Intake --   Output 700 ml   Net -700 ml           Lines/drains/airway:       Peripheral IV - Single Lumen 01/19/24 1530 20 G Right Antecubital (Active)   Site Assessment Clean;Dry;Intact;No redness;No swelling 01/20/24 1103   Dressing Status Clean;Dry;Intact 01/20/24 1103   Number of days: 0       Physical examination:  Gen: NAD, AAOx3, answering questions appropriately  HEENT: Normocephalic, atraumatic  CV: RR, 2+ DPs bilaterally  Resp: NWOB  Abd: S/NT/ND  Msk: moving all extremities spontaneously and purposefully. Pain with movement of LLE.   Neuro: CN II-XII grossly intact  Skin/wounds: Warm, dry, intact.Surgical dressing with ACE in place to LLE.    Labs:  Renal:  Recent Labs     01/19/24 1708 01/20/24 0408 01/21/24  0543 01/22/24  0855   BUN 17.6 15.5 13.0 7.9*   CREATININE 1.12* 0.84 0.77 0.68       No results for input(s): "LACTIC" in the last 72 hours.  FEN/GI:  Recent Labs     01/19/24 1708 01/20/24 0408 01/21/24  0543 01/22/24  0855    139 133* 135*   K 4.4 4.6 4.4 4.2   CO2 25 28 30 30   CALCIUM 9.8 8.9 8.1* 8.3*   MG  --  2.00 1.90 1.90   PHOS  --  4.0 2.7 2.8   ALBUMIN 4.3  --   --   --    BILITOT 0.3  --   --   --    AST 19  --   --   --    ALKPHOS 63  --   --   --    ALT 18  --   --   --        Heme:  Recent Labs     01/19/24 1708 01/20/24 0408 01/21/24  0543 01/22/24  0855   HGB 12.6 11.7* 9.1* 8.2*   HCT 37.8 35.9* 28.1* 25.1*    203 199 190   INR 1.0  --   --   --        ID:  Recent Labs     01/19/24  1708 01/20/24  0408 01/21/24  0543 01/22/24  0855   WBC 12.17* 8.05 6.93 6.20       CBG:  Recent Labs     01/19/24  1708 01/20/24  0408 01/21/24  0543 01/22/24  0855   GLUCOSE 116* 121* 132* 119*        No results for input(s): "POCTGLUCOSE" in the last 72 " "hours.   Cardiovascular:  No results for input(s): "TROPONINI", "CKTOTAL", "CKMB", "BNP" in the last 168 hours.  I have reviewed all pertinent lab results within the past 24 hours.    Imaging:  X-Ray Femur 2 View Left   Final Result      Expected postoperative changes following intramedullary lisbeth fixation of the left femur.         Electronically signed by: Sotero Kinney MD   Date:    01/20/2024   Time:    11:24      SURG FL Surgery Fluoro Usage   Final Result      CT 3D RECON WITHOUT INDEPENDENT WS   Final Result      CT Hip Without Contrast Left   Final Result      No evidence of acute fracture      Likely old avulsion injury at the tip of the greater trochanter         Electronically signed by: Len Keys   Date:    01/19/2024   Time:    17:11      CT Knee Without Contrast Left   Final Result      Fracture of the distal femur as outlined above         Electronically signed by: Len Keys   Date:    01/19/2024   Time:    17:19      X-Ray Chest AP Portable   Final Result      No acute pulmonary process identified.         Electronically signed by: Vincent Ruby   Date:    01/19/2024   Time:    16:43      X-Ray Hip 2 or 3 views Left (with Pelvis when performed)   Final Result      No acute osseous abnormality identified.         Electronically signed by: Robbin Kirkland   Date:    01/19/2024   Time:    16:25      X-Ray Knee Complete 4 or More Views Left   Final Result      Fracture distal femur.         Electronically signed by: Robbin Kirkland   Date:    01/19/2024   Time:    16:24         I have reviewed all pertinent imaging results/findings within the past 24 hours.    Micro/Path/Other:  Microbiology Results (last 7 days)       ** No results found for the last 168 hours. **           Specimen (168h ago, onward)      None             Problems list:  Active Problem List with Overview Notes    Diagnosis Date Noted    HTN (hypertension) 01/21/2024    Anxiety 01/21/2024    Fall 01/21/2024    Closed fracture of left " distal femur 01/20/2024    Closed nondisplaced fracture of greater trochanter of left femur with malunion 01/20/2024        Assessment & Plan:     Closed distal left femur fracture  - s/p ORIF L distal femur with intracondylar split (1/20)  - [facility]  - PT/OT  - Fall precautions   - Per Ortho: NWB LLE, ROMAT. Will need Lovenox x 30 days at discharge. Daily dry dressing changes. Follow up in clinic in 3 weeks for wound recheck and XR.     Acute blood loss anemia  - H&H down, continue to trend AM CBC    HTN  - Continue home antihypertensives     Anxiety  - Continue home citalopram     Fall  - [facility]  - Lovenox 40mg q12h for VTE ppx  - Fall and standard precautions   - Hourly IS while awake  - Continue working with PT/OT, would like dispo home with family, likely outpatient PT pending PT recs     Bozena Kingston, AGACNP-BC, FNP-BC  Trauma Surgery  [facility]  C: [phone]

## 2024-01-22 NOTE — PT/OT/SLP EVAL
Occupational Therapy  Evaluation    Name: Nurys Joe  MRN: [id number]  Admitting Diagnosis: s/p fall with L distal femur fx  Recent Surgery: Procedure(s) (LRB):  INSERTION, INTRAMEDULLARY LAURENT, FEMUR, DISTAL, RETROGRADE (Left) 2 Days Post-Op    Recommendations:     Discharge therapy intensity:     Discharge Equipment Recommendations:  walker, rolling (other TBD)  Barriers to discharge:  Decreased caregiver support (son can asisst some but goes back to work in a week, sister can assist some possibly per pt)    Assessment:     Nurys Joe is a 63 y.o. female with a medical diagnosis of s/p fall with L distal femur fx.  She presents with the following performance deficits affecting function: weakness, impaired endurance, impaired self care skills, impaired functional mobility, decreased lower extremity function.     Rehab Prognosis: good; patient would benefit from acute skilled OT services to address these deficits and reach maximum level of function.       Plan:     Patient to be seen 5 x/week to address the above listed problems via self-care/home management, therapeutic activities, therapeutic exercises  Plan of Care Expires: 02/19/24  Plan of Care Reviewed with: patient    Subjective     Chief Complaint: c/o LLE pain   Patient/Family Comments/goals: would like to return to home     Occupational Profile:  Living Environment: lives alone in SS home with 3 steps to enter, 3 steps down to sunroom, has walk in shower with low threshold  Previous level of function: independent  Roles and Routines: mom, sister  Equipment Used at Home: none  Assistance upon Discharge: yes, son for a week until he returns to work, sister may be able to assist     Pain/Comfort:  Pain Rating 1:  (says she has some pain in LLE but no number stated)  Pain Addressed 1: Pre-medicate for activity    Patients cultural, spiritual, Christian conflicts given the current situation:      Objective:     OT communicated with nsg prior to session.       Patient was found supine with peripheral IV, PureWick, telemetry upon OT entry to room.    General Precautions: Standard, fall  Orthopedic Precautions: LLE non weight bearing  Braces:      Vital Signs: 69/43 once sitting, after sitting 1-2 min 79/50- pt with c/o feeling very weak and off and on light headed  Bed Mobility:    Sup to sit with min assist    Functional Mobility/Transfers:    Functional Mobility: deferred standing and further mobility 2/2 decreased BP and pt symptomatic    Activities of Daily Living:  L sock with total assist    AMPA 6 Click ADL:  AMPA Total Score:      Functional Cognition:  intact    Visual Perceptual Skills:  intact    Upper Extremity Function:  Right Upper Extremity:   Wfl     Left Upper Extremity:  wfl    Balance:   Good sittting EOB    Patient Education:  Patient provided with verbal education education regarding OT role/goals/POC, post op precautions, fall prevention, and Discharge/DME recommendations.  Understanding was verbalized.     Patient left HOB elevated with all lines intact, call button in reach, and nsg notified    GOALS:   Multidisciplinary Problems       Occupational Therapy Goals          Problem: Occupational Therapy    Goal Priority Disciplines Outcome Interventions   Occupational Therapy Goal     OT, PT/OT Ongoing, Progressing    Description: Goals to be met by: 2/19/24     Patient will increase functional independence with ADLs by performing:    LE Dressing with Modified [place].  Grooming while standing with Modified [place].  Toileting from bedside commode with Modified [place] for hygiene and clothing management.   Toilet transfer to bedside commode with Modified [place].progress from bedside commode                         History:     Past Medical History:   Diagnosis Date    Anxiety     Hypertension          Past Surgical History:   Procedure Laterality Date    COLONOSCOPY  04/2019    RETROGRADE INTRAMEDULLARY RODDING OF DISTAL  FEMUR Left 1/20/2024    Procedure: INSERTION, INTRAMEDULLARY LAURENT, FEMUR, DISTAL, RETROGRADE;  Surgeon: Kraig Saldivar DO;  Location: [facility];  Service: Orthopedics;  Laterality: Left;  supine kaylyn traction triangles synthes c arm       Time Tracking:     OT Date of Treatment: 01/22/24  OT Start Time: 1024  OT Stop Time: 1045  OT Total Time (min): 21 min    Billable Minutes:Evaluation mod complexity    1/22/2024

## 2024-01-22 NOTE — PLAN OF CARE
01/22/24 1121   Discharge Assessment   Assessment Type Discharge Planning Assessment   Confirmed/corrected address, phone number and insurance Yes   Confirmed Demographics Correct on Facesheet   Source of Information patient   Communicated WIL with patient/caregiver Date not available/Unable to determine   Reason For Admission L femur fx s/p ORIF   People in Home alone  (Pt lives alone in a single story home with 3 steps to enter home and no rails along the steps)   Do you expect to return to your current living situation? Yes   Do you have help at home or someone to help you manage your care at home? Yes   Who are your caregiver(s) and their phone number(s)? pt's son, Jose   Prior to hospitilization cognitive status: Alert/Oriented   Current cognitive status: Alert/Oriented   Walking or Climbing Stairs Difficulty no   Dressing/Bathing Difficulty no   Home Layout Able to live on 1st floor   Equipment Currently Used at Home none   Readmission within 30 days? No   Patient currently being followed by outpatient case management? No   Do you currently have service(s) that help you manage your care at home? No   Who is going to help you get home at discharge? family   How do you get to doctors appointments? car, drives self   Are you on dialysis? No   Discharge Plan A Rehab   Discharge Plan B Home Health   DME Needed Upon Discharge  other (see comments)  (TBD)   Discharge Plan discussed with: Patient   Transition of Care Barriers None   Housing Stability   In the last 12 months, was there a time when you were not able to pay the mortgage or rent on time? N   Transportation Needs   In the past 12 months, has lack of transportation kept you from medical appointments or from getting medications? no   Food Insecurity   Within the past 12 months, you worried that your food would run out before you got the money to buy more. Never true     Pt's PCP is Dr Jp Lopez. Pt's  is her son. Jose ([phone]). Pt has  never had  services. Pt uses [facility] in [place] for her pharmacy. Pt does drive and is self employed as a . Therapy is recommending rehab at this time. Will wait to see how pt does tomorrow with therapy. CM to follow

## 2024-01-22 NOTE — PT/OT/SLP PROGRESS
Physical Therapy Treatment    Patient Name:  Nurys Joe   MRN:  [id number]    Recommendations:     Discharge therapy intensity: High Intensity Therapy   Discharge Equipment Recommendations: walker, rolling  Barriers to discharge: Decreased caregiver support and Impaired mobility    Assessment:     Nurys Joe is a 63 y.o. female admitted with a medical diagnosis of Closed fracture of left distal femur.  She presents with the following impairments/functional limitations: impaired endurance, weakness, impaired self care skills, impaired functional mobility, gait instability, decreased lower extremity function, pain.    Rehab Prognosis: Good; patient would benefit from acute skilled PT services to address these deficits and reach maximum level of function.    Recent Surgery: Procedure(s) (LRB):  INSERTION, INTRAMEDULLARY LAURENT, FEMUR, DISTAL, RETROGRADE (Left) 2 Days Post-Op    Plan:     During this hospitalization, patient to be seen 6 x/week to address the identified rehab impairments via gait training, therapeutic activities, therapeutic exercises and progress toward the following goals:    Plan of Care Expires:  01/20/24    Subjective     Chief Complaint: pain and pt medicated    Objective:     Communicated with nurse prior to session.  Patient found supine with   upon PT entry to room.     General Precautions: Standard, fall  Orthopedic Precautions: LLE non weight bearing  Braces:    Respiratory Status: Room air  Blood Pressure: 117/79  Skin Integrity: Visible skin intact      Functional Mobility:  Min assist to get EOB and same to stand  Gait 10 ft RW min assist NWB LLE. Limited gait due to nausea  AAROM to LLE but limited by pain. Instructed in HEP for ROM.     Education Provided:  Role and goals of PT, transfer training, bed mobility, gait training, balance training, safety awareness, assistive device, strengthening exercises, and importance of participating in PT to return to PLOF.     Patient left up in  chair with call button in reach..    GOALS:   Multidisciplinary Problems       Physical Therapy Goals          Problem: Physical Therapy    Goal Priority Disciplines Outcome Goal Variances Interventions   Physical Therapy Goal     PT, PT/OT Ongoing, Progressing     Description: Pt will be seen for the following goals  1. Pt will perform bed mobility sba  2. Pt will perform sit to stand with a rw sba  3. Pt will amb NWB LLE with a rw 25ft cga  4. Pt will go up and down 4 steps with right sided rail and AD if needed                       Time Tracking:       Billable Minutes: Gait Training 16    Treatment Type: Treatment  PT/PTA: PTA     Number of PTA visits since last PT visit: 1 01/22/2024

## 2024-01-22 NOTE — PLAN OF CARE
Problem: Occupational Therapy  Goal: Occupational Therapy Goal  Description: Goals to be met by: 2/19/24     Patient will increase functional independence with ADLs by performing:    LE Dressing with Modified [place].  Grooming while standing with Modified [place].  Toileting from bedside commode with Modified [place] for hygiene and clothing management.   Toilet transfer to bedside commode with Modified [place].progress from bedside commode    Outcome: Ongoing, Progressing

## 2024-01-23 LAB
ABO + RH BLD: NORMAL
ABO + RH BLD: NORMAL
ANION GAP SERPL CALC-SCNC: 7 MEQ/L
BASOPHILS # BLD AUTO: 0.02 X10(3)/MCL
BASOPHILS NFR BLD AUTO: 0.3 %
BLD PROD TYP BPU: NORMAL
BLD PROD TYP BPU: NORMAL
BLOOD UNIT EXPIRATION DATE: NORMAL
BLOOD UNIT EXPIRATION DATE: NORMAL
BLOOD UNIT TYPE CODE: 6200
BLOOD UNIT TYPE CODE: 6200
BUN SERPL-MCNC: 8.8 MG/DL (ref 9.8–20.1)
CALCIUM SERPL-MCNC: 8.3 MG/DL (ref 8.4–10.2)
CHLORIDE SERPL-SCNC: 104 MMOL/L (ref 98–107)
CO2 SERPL-SCNC: 28 MMOL/L (ref 23–31)
CREAT SERPL-MCNC: 0.65 MG/DL (ref 0.55–1.02)
CREAT/UREA NIT SERPL: 14
CROSSMATCH INTERPRETATION: NORMAL
CROSSMATCH INTERPRETATION: NORMAL
DISPENSE STATUS: NORMAL
DISPENSE STATUS: NORMAL
EOSINOPHIL # BLD AUTO: 0.08 X10(3)/MCL (ref 0–0.9)
EOSINOPHIL NFR BLD AUTO: 1.4 %
ERYTHROCYTE [DISTWIDTH] IN BLOOD BY AUTOMATED COUNT: 12.6 % (ref 11.5–17)
GFR SERPLBLD CREATININE-BSD FMLA CKD-EPI: >60 MLS/MIN/1.73/M2
GLUCOSE SERPL-MCNC: 113 MG/DL (ref 82–115)
GROUP & RH: NORMAL
HCT VFR BLD AUTO: 20.3 % (ref 37–47)
HGB BLD-MCNC: 6.9 G/DL (ref 12–16)
IMM GRANULOCYTES # BLD AUTO: 0.01 X10(3)/MCL (ref 0–0.04)
IMM GRANULOCYTES NFR BLD AUTO: 0.2 %
INDIRECT COOMBS: NORMAL
LYMPHOCYTES # BLD AUTO: 1.17 X10(3)/MCL (ref 0.6–4.6)
LYMPHOCYTES NFR BLD AUTO: 19.8 %
MAGNESIUM SERPL-MCNC: 2 MG/DL (ref 1.6–2.6)
MCH RBC QN AUTO: 30.3 PG (ref 27–31)
MCHC RBC AUTO-ENTMCNC: 34 G/DL (ref 33–36)
MCV RBC AUTO: 89 FL (ref 80–94)
MONOCYTES # BLD AUTO: 0.55 X10(3)/MCL (ref 0.1–1.3)
MONOCYTES NFR BLD AUTO: 9.3 %
NEUTROPHILS # BLD AUTO: 4.07 X10(3)/MCL (ref 2.1–9.2)
NEUTROPHILS NFR BLD AUTO: 69 %
NRBC BLD AUTO-RTO: 0 %
PHOSPHATE SERPL-MCNC: 3.1 MG/DL (ref 2.3–4.7)
PLATELET # BLD AUTO: 187 X10(3)/MCL (ref 130–400)
PMV BLD AUTO: 9.3 FL (ref 7.4–10.4)
POTASSIUM SERPL-SCNC: 4.2 MMOL/L (ref 3.5–5.1)
RBC # BLD AUTO: 2.28 X10(6)/MCL (ref 4.2–5.4)
SODIUM SERPL-SCNC: 139 MMOL/L (ref 136–145)
SPECIMEN OUTDATE: NORMAL
UNIT NUMBER: NORMAL
UNIT NUMBER: NORMAL
WBC # SPEC AUTO: 5.9 X10(3)/MCL (ref 4.5–11.5)

## 2024-01-23 PROCEDURE — 36430 TRANSFUSION BLD/BLD COMPNT: CPT

## 2024-01-23 PROCEDURE — 21400001 HC TELEMETRY ROOM

## 2024-01-23 PROCEDURE — 97116 GAIT TRAINING THERAPY: CPT | Mod: CQ

## 2024-01-23 PROCEDURE — 80048 BASIC METABOLIC PNL TOTAL CA: CPT

## 2024-01-23 PROCEDURE — P9016 RBC LEUKOCYTES REDUCED: HCPCS | Performed by: SURGERY

## 2024-01-23 PROCEDURE — 86850 RBC ANTIBODY SCREEN: CPT | Performed by: SURGERY

## 2024-01-23 PROCEDURE — 25000003 PHARM REV CODE 250

## 2024-01-23 PROCEDURE — 83735 ASSAY OF MAGNESIUM: CPT

## 2024-01-23 PROCEDURE — 63600175 PHARM REV CODE 636 W HCPCS

## 2024-01-23 PROCEDURE — 84100 ASSAY OF PHOSPHORUS: CPT

## 2024-01-23 PROCEDURE — 86923 COMPATIBILITY TEST ELECTRIC: CPT | Performed by: SURGERY

## 2024-01-23 PROCEDURE — 85025 COMPLETE CBC W/AUTO DIFF WBC: CPT

## 2024-01-23 PROCEDURE — 25000003 PHARM REV CODE 250: Performed by: NURSE PRACTITIONER

## 2024-01-23 PROCEDURE — 97110 THERAPEUTIC EXERCISES: CPT | Mod: CQ

## 2024-01-23 RX ORDER — HYDROCODONE BITARTRATE AND ACETAMINOPHEN 500; 5 MG/1; MG/1
TABLET ORAL
Status: DISCONTINUED | OUTPATIENT
Start: 2024-01-23 | End: 2024-01-24 | Stop reason: HOSPADM

## 2024-01-23 RX ADMIN — OXYCODONE HYDROCHLORIDE 10 MG: 10 TABLET ORAL at 11:01

## 2024-01-23 RX ADMIN — POLYETHYLENE GLYCOL 3350 17 G: 17 POWDER, FOR SOLUTION ORAL at 08:01

## 2024-01-23 RX ADMIN — METHOCARBAMOL 500 MG: 500 TABLET ORAL at 08:01

## 2024-01-23 RX ADMIN — ENOXAPARIN SODIUM 40 MG: 100 INJECTION SUBCUTANEOUS at 09:01

## 2024-01-23 RX ADMIN — OXYCODONE HYDROCHLORIDE 10 MG: 10 TABLET ORAL at 01:01

## 2024-01-23 RX ADMIN — OXYCODONE HYDROCHLORIDE 10 MG: 10 TABLET ORAL at 02:01

## 2024-01-23 RX ADMIN — POLYETHYLENE GLYCOL 3350 17 G: 17 POWDER, FOR SOLUTION ORAL at 09:01

## 2024-01-23 RX ADMIN — METHOCARBAMOL 500 MG: 500 TABLET ORAL at 02:01

## 2024-01-23 RX ADMIN — OXYCODONE HYDROCHLORIDE 10 MG: 10 TABLET ORAL at 09:01

## 2024-01-23 RX ADMIN — Medication 6 MG: at 08:01

## 2024-01-23 RX ADMIN — OXYCODONE HYDROCHLORIDE 10 MG: 10 TABLET ORAL at 07:01

## 2024-01-23 RX ADMIN — OXYCODONE HYDROCHLORIDE 10 MG: 10 TABLET ORAL at 05:01

## 2024-01-23 RX ADMIN — METHOCARBAMOL 500 MG: 500 TABLET ORAL at 09:01

## 2024-01-23 RX ADMIN — CITALOPRAM HYDROBROMIDE 20 MG: 20 TABLET ORAL at 08:01

## 2024-01-23 RX ADMIN — ENOXAPARIN SODIUM 40 MG: 100 INJECTION SUBCUTANEOUS at 08:01

## 2024-01-23 NOTE — PT/OT/SLP PROGRESS
Physical Therapy Treatment    Patient Name:  Nurys Joe   MRN:  [id number]    Recommendations:     Discharge therapy intensity: High Intensity Therapy   Discharge Equipment Recommendations: walker, rolling  Barriers to discharge: Impaired mobility    Assessment:     Nurys Joe is a 63 y.o. female admitted with a medical diagnosis of Closed fracture of left distal femur.  She presents with the following impairments/functional limitations: impaired endurance, weakness, impaired self care skills, impaired functional mobility, gait instability, decreased lower extremity function, pain.    Rehab Prognosis: Good; patient would benefit from acute skilled PT services to address these deficits and reach maximum level of function.    Recent Surgery: Procedure(s) (LRB):  INSERTION, INTRAMEDULLARY LAURENT, FEMUR, DISTAL, RETROGRADE (Left) 3 Days Post-Op    Plan:     During this hospitalization, patient to be seen 6 x/week to address the identified rehab impairments via gait training, therapeutic activities, therapeutic exercises and progress toward the following goals:    Plan of Care Expires:  01/20/24    Subjective     Chief Complaint: Pain LLE    Objective:     Communicated with nurse prior to session.  Patient found supine with   upon PT entry to room.     General Precautions: Standard, fall  Orthopedic Precautions: LLE non weight bearing  Braces:    Respiratory Status: Room air  Blood Pressure: 131/84  Skin Integrity:  coverlet dressing LLE      Functional Mobility:  Min assist to manage LE OOB and SBA STS NWB LLE  Gait 30 ft 40 ft RW CGA NWB LLE  Pt performed LE PRE's to increase strenth, ROM, and endurance to improve overall independence.  Instructed in HEP especially ROM left knee  Rec high intensity but if son can assist she can be low intensity.    Education Provided:  Role and goals of PT, transfer training, bed mobility, gait training, balance training, safety awareness, assistive device, strengthening  exercises, and importance of participating in PT to return to PLOF.    Patient left up in chair with call button in reach..    GOALS:   Multidisciplinary Problems       Physical Therapy Goals          Problem: Physical Therapy    Goal Priority Disciplines Outcome Goal Variances Interventions   Physical Therapy Goal     PT, PT/OT Ongoing, Progressing     Description: Pt will be seen for the following goals  1. Pt will perform bed mobility sba  2. Pt will perform sit to stand with a rw sba  3. Pt will amb NWB LLE with a rw 25ft cga  4. Pt will go up and down 4 steps with right sided rail and AD if needed                       Time Tracking:     Billable Minutes: Gait Training 15 and Therapeutic Exercise 14    Treatment Type: Treatment  PT/PTA: PTA     Number of PTA visits since last PT visit: 2     01/23/2024

## 2024-01-23 NOTE — PROGRESS NOTES
Pt pending blood transfusion for H&H 6.9/20, will defer at this time and con't to f/u as appropriate.

## 2024-01-23 NOTE — PROGRESS NOTES
"   Trauma Surgery   Progress Note  Admit Date: 1/19/2024  HD#4  POD#3 Days Post-Op    Subjective:   Interval history:  Some weakness today.  Hemoglobin 6.9.  Ordered for transfusion ordered.  Working well with therapy but limited yesterday.  No new complaints.  Dressings clean dry and intact.    Home Meds:   Current Outpatient Medications   Medication Instructions    amLODIPine (NORVASC) 5 mg, Oral    citalopram (CELEXA) 40 mg, Oral    conjugated estrogens (PREMARIN) vaginal cream   See Instructions, 0.5gms vaginally q hs x 14 then 2-3x/week, # 30 gm, 6 Refill(s), Pharmacy: [facility]- DONAVAN Melendez, 168, cm, Height/Length Dosing, 08/18/22 10:16:00 CDT, 65.6, kg, Weight Dosing, 08/18/22 10:15:00 CDT    conjugated estrogens (PREMARIN) 0.5 g, Vaginal, Daily    losartan (COZAAR) 50 mg, Oral      Scheduled Meds:   amLODIPine  5 mg Oral Daily    citalopram  20 mg Oral QHS    enoxparin  40 mg Subcutaneous Q12H (prophylaxis, 0900/2100)    losartan  50 mg Oral Daily    methocarbamoL  500 mg Oral TID    polyethylene glycol  17 g Oral BID     Continuous Infusions:      PRN Meds:0.9%  NaCl infusion (for blood administration), acetaminophen, acetaminophen, LIDOcaine HCL 10 mg/ml (1%), melatonin, morphine, naloxone, ondansetron, oxyCODONE, oxyCODONE, senna-docusate 8.6-50 mg, sodium chloride 0.9%     Objective:     VITAL SIGNS: 24 HR MIN & MAX LAST   Temp  Min: 97.9 °F (36.6 °C)  Max: 98.5 °F (36.9 °C)  98.5 °F (36.9 °C)   BP  Min: 103/68  Max: 124/74  105/70    Pulse  Min: 89  Max: 98  92    Resp  Min: 17  Max: 20  18    SpO2  Min: 93 %  Max: 100 %  (!) 93 %      HT: 5' 5.98" (167.6 cm)  WT: 65.8 kg (145 lb)  BMI: 23.4     Intake/output:  Intake/Output - Last 3 Shifts         01/21 0700 01/22 0659 01/22 0700 01/23 0659 01/23 0700 01/24 0659    P.O. 600      I.V. (mL/kg)       IV Piggyback       Total Intake(mL/kg) 600 (9.1)      Urine (mL/kg/hr) 800 (0.5) 2350 (1.5)     Stool 0      Blood       Total Output 800 2350     " "Net -200 -2350            Urine Occurrence 3 x      Stool Occurrence 1 x              Intake/Output Summary (Last 24 hours) at 1/23/2024 0704  Last data filed at 1/23/2024 0632  Gross per 24 hour   Intake --   Output 2350 ml   Net -2350 ml           Lines/drains/airway:       Peripheral IV - Single Lumen 01/19/24 1530 20 G Right Antecubital (Active)   Site Assessment Clean;Dry;Intact;No redness;No swelling 01/20/24 1103   Dressing Status Clean;Dry;Intact 01/20/24 1103   Number of days: 0       Physical examination:  Gen: NAD, AAOx3, answering questions appropriately  HEENT: Normocephalic, atraumatic  CV: RR, 2+ DPs bilaterally  Resp: NWOB  Abd: S/NT/ND  Msk: moving all extremities spontaneously and purposefully. Pain with movement of LLE.   Neuro: CN II-XII grossly intact  Skin/wounds: Warm, dry, intact.Surgical dressing in place to LLE.    Labs:  Renal:  Recent Labs     01/21/24  0543 01/22/24  0855 01/23/24  0403   BUN 13.0 7.9* 8.8*   CREATININE 0.77 0.68 0.65       No results for input(s): "LACTIC" in the last 72 hours.  FEN/GI:  Recent Labs     01/21/24  0543 01/22/24  0855 01/23/24  0403   * 135* 139   K 4.4 4.2 4.2   CO2 30 30 28   CALCIUM 8.1* 8.3* 8.3*   MG 1.90 1.90 2.00   PHOS 2.7 2.8 3.1       Heme:  Recent Labs     01/21/24  0543 01/22/24  0855 01/23/24  0403   HGB 9.1* 8.2* 6.9*   HCT 28.1* 25.1* 20.3*    190 187       ID:  Recent Labs     01/21/24  0543 01/22/24  0855 01/23/24  0403   WBC 6.93 6.20 5.90       CBG:  Recent Labs     01/21/24  0543 01/22/24  0855 01/23/24  0403   GLUCOSE 132* 119* 113        No results for input(s): "POCTGLUCOSE" in the last 72 hours.   Cardiovascular:  No results for input(s): "TROPONINI", "CKTOTAL", "CKMB", "BNP" in the last 168 hours.  I have reviewed all pertinent lab results within the past 24 hours.    Imaging:  X-Ray Femur 2 View Left   Final Result      Expected postoperative changes following intramedullary lisbeth fixation of the left femur.       "   Electronically signed by: Sotero Kinney MD   Date:    01/20/2024   Time:    11:24      SURG FL Surgery Fluoro Usage   Final Result      CT 3D RECON WITHOUT INDEPENDENT WS   Final Result      CT Hip Without Contrast Left   Final Result      No evidence of acute fracture      Likely old avulsion injury at the tip of the greater trochanter         Electronically signed by: Len Keys   Date:    01/19/2024   Time:    17:11      CT Knee Without Contrast Left   Final Result      Fracture of the distal femur as outlined above         Electronically signed by: Len Keys   Date:    01/19/2024   Time:    17:19      X-Ray Chest AP Portable   Final Result      No acute pulmonary process identified.         Electronically signed by: Vincent Ruby   Date:    01/19/2024   Time:    16:43      X-Ray Hip 2 or 3 views Left (with Pelvis when performed)   Final Result      No acute osseous abnormality identified.         Electronically signed by: Robbin Kirkland   Date:    01/19/2024   Time:    16:25      X-Ray Knee Complete 4 or More Views Left   Final Result      Fracture distal femur.         Electronically signed by: Robbin Kirkland   Date:    01/19/2024   Time:    16:24         I have reviewed all pertinent imaging results/findings within the past 24 hours.    Micro/Path/Other:  Microbiology Results (last 7 days)       ** No results found for the last 168 hours. **           Specimen (168h ago, onward)      None             Problems list:  Active Problem List with Overview Notes    Diagnosis Date Noted    Acute blood loss anemia 01/22/2024    HTN (hypertension) 01/21/2024    Anxiety 01/21/2024    Fall 01/21/2024    Closed fracture of left distal femur 01/20/2024    Closed nondisplaced fracture of greater trochanter of left femur with malunion 01/20/2024        Assessment & Plan:     Closed distal left femur fracture  - s/p ORIF L distal femur with intracondylar split (1/20)  - MMPC  - PT/OT  - Fall precautions   - Per Ortho: NWB  JOCELYNE, ROMAT. Will need Lovenox x 30 days at discharge. Daily dry dressing changes. Follow up in clinic in 3 weeks for wound recheck and XR.     Acute blood loss anemia  - H&H down  - Transfuse 2 units  - Labs in AM    HTN  - Continue home antihypertensives     Anxiety  - Continue home citalopram     Fall  - MMPC  - Lovenox 40mg q12h for VTE ppx  - Fall and standard precautions   - Hourly IS while awake  - Continue working with PT/OT, would like dispo home with family, likely outpatient PT pending PT recs

## 2024-01-23 NOTE — PLAN OF CARE
Pt remains a bit hesitant about going to inpt rehab. Son is at bedside encouraging her to do what therapy recommends.   Referral has been sent pt mentions she may change her mind.   Will follow

## 2024-01-24 VITALS
SYSTOLIC BLOOD PRESSURE: 130 MMHG | OXYGEN SATURATION: 99 % | HEIGHT: 66 IN | TEMPERATURE: 98 F | DIASTOLIC BLOOD PRESSURE: 79 MMHG | HEART RATE: 89 BPM | WEIGHT: 145 LBS | BODY MASS INDEX: 23.3 KG/M2 | RESPIRATION RATE: 18 BRPM

## 2024-01-24 LAB
ANION GAP SERPL CALC-SCNC: 7 MEQ/L
BASOPHILS # BLD AUTO: 0.05 X10(3)/MCL
BASOPHILS NFR BLD AUTO: 0.9 %
BUN SERPL-MCNC: 8.5 MG/DL (ref 9.8–20.1)
CALCIUM SERPL-MCNC: 8.6 MG/DL (ref 8.4–10.2)
CHLORIDE SERPL-SCNC: 103 MMOL/L (ref 98–107)
CO2 SERPL-SCNC: 30 MMOL/L (ref 23–31)
CREAT SERPL-MCNC: 0.63 MG/DL (ref 0.55–1.02)
CREAT/UREA NIT SERPL: 13
EOSINOPHIL # BLD AUTO: 0.22 X10(3)/MCL (ref 0–0.9)
EOSINOPHIL NFR BLD AUTO: 3.9 %
ERYTHROCYTE [DISTWIDTH] IN BLOOD BY AUTOMATED COUNT: 13.8 % (ref 11.5–17)
GFR SERPLBLD CREATININE-BSD FMLA CKD-EPI: >60 MLS/MIN/1.73/M2
GLUCOSE SERPL-MCNC: 102 MG/DL (ref 82–115)
HCT VFR BLD AUTO: 28 % (ref 37–47)
HGB BLD-MCNC: 9.8 G/DL (ref 12–16)
IMM GRANULOCYTES # BLD AUTO: 0.01 X10(3)/MCL (ref 0–0.04)
IMM GRANULOCYTES NFR BLD AUTO: 0.2 %
LYMPHOCYTES # BLD AUTO: 1.37 X10(3)/MCL (ref 0.6–4.6)
LYMPHOCYTES NFR BLD AUTO: 24 %
MAGNESIUM SERPL-MCNC: 2 MG/DL (ref 1.6–2.6)
MCH RBC QN AUTO: 30.9 PG (ref 27–31)
MCHC RBC AUTO-ENTMCNC: 35 G/DL (ref 33–36)
MCV RBC AUTO: 88.3 FL (ref 80–94)
MONOCYTES # BLD AUTO: 0.63 X10(3)/MCL (ref 0.1–1.3)
MONOCYTES NFR BLD AUTO: 11 %
NEUTROPHILS # BLD AUTO: 3.43 X10(3)/MCL (ref 2.1–9.2)
NEUTROPHILS NFR BLD AUTO: 60 %
NRBC BLD AUTO-RTO: 0 %
PHOSPHATE SERPL-MCNC: 4 MG/DL (ref 2.3–4.7)
PLATELET # BLD AUTO: 200 X10(3)/MCL (ref 130–400)
PMV BLD AUTO: 9.5 FL (ref 7.4–10.4)
POTASSIUM SERPL-SCNC: 4.3 MMOL/L (ref 3.5–5.1)
RBC # BLD AUTO: 3.17 X10(6)/MCL (ref 4.2–5.4)
SODIUM SERPL-SCNC: 140 MMOL/L (ref 136–145)
WBC # SPEC AUTO: 5.71 X10(3)/MCL (ref 4.5–11.5)

## 2024-01-24 PROCEDURE — 25000003 PHARM REV CODE 250: Performed by: NURSE PRACTITIONER

## 2024-01-24 PROCEDURE — 84100 ASSAY OF PHOSPHORUS: CPT

## 2024-01-24 PROCEDURE — 80048 BASIC METABOLIC PNL TOTAL CA: CPT

## 2024-01-24 PROCEDURE — 83735 ASSAY OF MAGNESIUM: CPT

## 2024-01-24 PROCEDURE — 97116 GAIT TRAINING THERAPY: CPT | Mod: CQ

## 2024-01-24 PROCEDURE — 63600175 PHARM REV CODE 636 W HCPCS

## 2024-01-24 PROCEDURE — 85025 COMPLETE CBC W/AUTO DIFF WBC: CPT

## 2024-01-24 PROCEDURE — 25000003 PHARM REV CODE 250

## 2024-01-24 PROCEDURE — 97110 THERAPEUTIC EXERCISES: CPT | Mod: CQ

## 2024-01-24 RX ORDER — ENOXAPARIN SODIUM 100 MG/ML
40 INJECTION SUBCUTANEOUS DAILY
Qty: 12 ML | Refills: 0 | Status: SHIPPED | OUTPATIENT
Start: 2024-01-24 | End: 2024-02-23

## 2024-01-24 RX ORDER — OXYCODONE HYDROCHLORIDE 5 MG/1
5 TABLET ORAL EVERY 4 HOURS PRN
Qty: 18 TABLET | Refills: 0 | Status: SHIPPED | OUTPATIENT
Start: 2024-01-24 | End: 2024-01-26 | Stop reason: SDUPTHER

## 2024-01-24 RX ORDER — METHOCARBAMOL 500 MG/1
500 TABLET, FILM COATED ORAL 3 TIMES DAILY
Qty: 30 TABLET | Refills: 0 | Status: SHIPPED | OUTPATIENT
Start: 2024-01-24 | End: 2024-02-05 | Stop reason: SDUPTHER

## 2024-01-24 RX ADMIN — OXYCODONE HYDROCHLORIDE 5 MG: 5 TABLET ORAL at 04:01

## 2024-01-24 RX ADMIN — ENOXAPARIN SODIUM 40 MG: 100 INJECTION SUBCUTANEOUS at 08:01

## 2024-01-24 RX ADMIN — OXYCODONE HYDROCHLORIDE 10 MG: 10 TABLET ORAL at 12:01

## 2024-01-24 RX ADMIN — METHOCARBAMOL 500 MG: 500 TABLET ORAL at 08:01

## 2024-01-24 RX ADMIN — METHOCARBAMOL 500 MG: 500 TABLET ORAL at 12:01

## 2024-01-24 RX ADMIN — OXYCODONE HYDROCHLORIDE 10 MG: 10 TABLET ORAL at 08:01

## 2024-01-24 RX ADMIN — AMLODIPINE BESYLATE 5 MG: 5 TABLET ORAL at 08:01

## 2024-01-24 RX ADMIN — POLYETHYLENE GLYCOL 3350 17 G: 17 POWDER, FOR SOLUTION ORAL at 08:01

## 2024-01-24 RX ADMIN — LOSARTAN POTASSIUM 50 MG: 50 TABLET, FILM COATED ORAL at 08:01

## 2024-01-24 NOTE — DISCHARGE SUMMARY
[facility]  Trauma  Discharge Summary      Patient Name: Nurys Joe  MRN: [id number]  Admission Date: 1/19/2024  Hospital Length of Stay: 5 days  Discharge Date and Time:  01/24/2024 11:42 AM  Attending Physician: Mustapha Anne Jr., MD   Discharging Provider: GADIEL López  Primary Care Provider: Jp Choi MD    HPI:   No notes on file    Procedure(s) (LRB):  INSERTION, INTRAMEDULLARY LAURENT, FEMUR, DISTAL, RETROGRADE (Left)      Indwelling Lines/Drains at time of discharge:   Lines/Drains/Airways       None                 Hospital Course: 63-year-old female presents to the hospital status post fall down stairs.  She was found to have a distal left femur fracture.  She went to the operating room and had open reduction internal fixation of the femur.  She will be nonweightbearing for 8 weeks.  She has been on Lovenox we will continue this for 30 days postop.  She will need follow up with Orthopedics.  There was initially some discussion about rehab although the patient feels comfortable going home.  She had a anemia requiring blood transfusion not unexpected given her injury and surgery.  She had an appropriate response today.  She was hemodynamically stable and agreeable with discharge.    Goals of Care Treatment Preferences:  Code Status: Full Code      Consults:   Consults (From admission, onward)          Status Ordering Provider     Inpatient consult to Social Work/Case Management  Once        Provider:  (Not yet assigned)    Acknowledged SOLOMON HIGUERA     Inpatient consult to Orthopedic Surgery  Once        Provider:  Kraig Saldivar O, DO    Completed SALENA LIZARRAGA            Significant Diagnostic Studies: N/A    Pending Diagnostic Studies:       None          Final Active Diagnoses:    Diagnosis Date Noted POA    PRINCIPAL PROBLEM:  Closed fracture of left distal femur [S72.402A] 01/20/2024 Yes    Acute blood loss anemia [D62] 01/22/2024 No    HTN  (hypertension) [I10] 01/21/2024 Yes    Anxiety [F41.9] 01/21/2024 Yes    Fall [W19.XXXA] 01/21/2024 Yes    Closed nondisplaced fracture of greater trochanter of left femur with malunion [S72.115P] 01/20/2024 Yes      Problems Resolved During this Admission:      Discharged Condition: good    Disposition: Home or Self Care    Follow Up:    Patient Instructions:      Weight bearing restrictions (specify):   Order Comments: Non weight bearing right lower extremity     Medications:  Reconciled Home Medications:      Medication List        START taking these medications      enoxaparin 40 mg/0.4 mL Syrg  Commonly known as: LOVENOX  Inject 0.4 mLs (40 mg total) into the skin Daily.     methocarbamoL 500 MG Tab  Commonly known as: ROBAXIN  Take 1 tablet (500 mg total) by mouth 3 (three) times daily. for 10 days     oxyCODONE 5 MG immediate release tablet  Commonly known as: ROXICODONE  Take 1 tablet (5 mg total) by mouth every 4 (four) hours as needed for Pain.            CONTINUE taking these medications      amLODIPine 5 MG tablet  Commonly known as: NORVASC  Take 5 mg by mouth.     citalopram 40 MG tablet  Commonly known as: CeleXA  Take 40 mg by mouth.     losartan 50 MG tablet  Commonly known as: COZAAR  Take 50 mg by mouth.     * PREMARIN vaginal cream  Generic drug: conjugated estrogens  See Instructions, 0.5gms vaginally q hs x 14 then 2-3x/week, # 30 gm, 6 Refill(s), [facility], cm, Height/Length Dosing, 08/18/22 10:16:00 CDT, 65.6, kg, Weight Dosing, 08/18/22 10:15:00 CDT     * conjugated estrogens vaginal cream  Commonly known as: PREMARIN  Place 0.5 g vaginally once daily.           * This list has 2 medication(s) that are the same as other medications prescribed for you. Read the directions carefully, and ask your doctor or other care provider to review them with you.                Time spent on the discharge of patient: 30 minutes    GADIEL López  Trauma  [facility]

## 2024-01-24 NOTE — DISCHARGE INSTRUCTIONS
Wound Care:  Dressings on Left Leg need to be changed daily.   Once daily remove old bandages, replace with new bandages.   DO NOT APPLY ANY CREAMS, OINTMENTS, SALVES, OR LOTIONS TO INCISION SITES.

## 2024-01-24 NOTE — HOSPITAL COURSE
63-year-old female presents to the hospital status post fall down stairs.  She was found to have a distal left femur fracture.  She went to the operating room and had open reduction internal fixation of the femur.  She will be nonweightbearing for 8 weeks.  She has been on Lovenox we will continue this for 30 days postop.  She will need follow up with Orthopedics.  There was initially some discussion about rehab although the patient feels comfortable going home.  She had a anemia requiring blood transfusion not unexpected given her injury and surgery.  She had an appropriate response today.  She was hemodynamically stable and agreeable with discharge.

## 2024-01-24 NOTE — PROGRESS NOTES
"   Trauma Surgery   Progress Note  Admit Date: 1/19/2024  HD#5  POD#4 Days Post-Op    Subjective:   Interval history:  Doing well.  Hemoglobin appropriate after blood transfusion yesterday.  Now stating she thinks she would prefer to go home with home health.  She has a family member who works at home health agency and can assist.    Home Meds:   Current Outpatient Medications   Medication Instructions    amLODIPine (NORVASC) 5 mg, Oral    citalopram (CELEXA) 40 mg, Oral    conjugated estrogens (PREMARIN) vaginal cream   See Instructions, 0.5gms vaginally q hs x 14 then 2-3x/week, # 30 gm, 6 Refill(s), Pharmacy: [facility]- DONAVAN Melendez, 168, cm, Height/Length Dosing, 08/18/22 10:16:00 CDT, 65.6, kg, Weight Dosing, 08/18/22 10:15:00 CDT    conjugated estrogens (PREMARIN) 0.5 g, Vaginal, Daily    losartan (COZAAR) 50 mg, Oral      Scheduled Meds:   amLODIPine  5 mg Oral Daily    citalopram  20 mg Oral QHS    enoxparin  40 mg Subcutaneous Q12H (prophylaxis, 0900/2100)    losartan  50 mg Oral Daily    methocarbamoL  500 mg Oral TID    polyethylene glycol  17 g Oral BID     Continuous Infusions:      PRN Meds:0.9%  NaCl infusion (for blood administration), acetaminophen, acetaminophen, LIDOcaine HCL 10 mg/ml (1%), melatonin, morphine, naloxone, ondansetron, oxyCODONE, oxyCODONE, senna-docusate 8.6-50 mg, sodium chloride 0.9%     Objective:     VITAL SIGNS: 24 HR MIN & MAX LAST   Temp  Min: 98 °F (36.7 °C)  Max: 99.6 °F (37.6 °C)  98.1 °F (36.7 °C)   BP  Min: 105/66  Max: 163/77  132/83    Pulse  Min: 85  Max: 99  85    Resp  Min: 16  Max: 20  16    SpO2  Min: 93 %  Max: 98 %  96 %      HT: 5' 5.98" (167.6 cm)  WT: 65.8 kg (145 lb)  BMI: 23.4     Intake/output:  Intake/Output - Last 3 Shifts         01/22 0700 01/23 0659 01/23 0700 01/24 0659    Blood  410.4    Total Intake(mL/kg)  410.4 (6.2)    Urine (mL/kg/hr) 2350 (1.5) 1625 (1)    Total Output 2350 1625    Net -2350 -1214.6          Urine Occurrence  1 x      " "      Intake/Output Summary (Last 24 hours) at 1/24/2024 0641  Last data filed at 1/24/2024 0558  Gross per 24 hour   Intake 410.42 ml   Output 1625 ml   Net -1214.58 ml           Lines/drains/airway:       Peripheral IV - Single Lumen 01/19/24 1530 20 G Right Antecubital (Active)   Site Assessment Clean;Dry;Intact;No redness;No swelling 01/20/24 1103   Dressing Status Clean;Dry;Intact 01/20/24 1103   Number of days: 0       Physical examination:  Gen: NAD, AAOx3, answering questions appropriately  HEENT: Normocephalic, atraumatic  CV: RR, 2+ DPs bilaterally  Resp: NWOB  Abd: S/NT/ND  Msk: moving all extremities spontaneously and purposefully. Pain with movement of LLE.   Neuro: CN II-XII grossly intact  Skin/wounds: Warm, dry, intact.Surgical dressing in place to LLE.    Labs:  Renal:  Recent Labs     01/22/24  0855 01/23/24  0403 01/24/24  0456   BUN 7.9* 8.8* 8.5*   CREATININE 0.68 0.65 0.63       No results for input(s): "LACTIC" in the last 72 hours.  FEN/GI:  Recent Labs     01/22/24  0855 01/23/24  0403 01/24/24  0456   * 139 140   K 4.2 4.2 4.3   CO2 30 28 30   CALCIUM 8.3* 8.3* 8.6   MG 1.90 2.00 2.00   PHOS 2.8 3.1 4.0       Heme:  Recent Labs     01/22/24  0855 01/23/24  0403 01/24/24  0456   HGB 8.2* 6.9* 9.8*   HCT 25.1* 20.3* 28.0*    187 200       ID:  Recent Labs     01/22/24  0855 01/23/24  0403 01/24/24  0456   WBC 6.20 5.90 5.71       CBG:  Recent Labs     01/22/24  0855 01/23/24  0403 01/24/24  0456   GLUCOSE 119* 113 102        No results for input(s): "POCTGLUCOSE" in the last 72 hours.   Cardiovascular:  No results for input(s): "TROPONINI", "CKTOTAL", "CKMB", "BNP" in the last 168 hours.  I have reviewed all pertinent lab results within the past 24 hours.    Imaging:     I have reviewed all pertinent imaging results/findings within the past 24 hours.    Micro/Path/Other:  Microbiology Results (last 7 days)       ** No results found for the last 168 hours. **           Specimen " (168h ago, onward)      None             Problems list:  Active Problem List with Overview Notes    Diagnosis Date Noted    Acute blood loss anemia 01/22/2024    HTN (hypertension) 01/21/2024    Anxiety 01/21/2024    Fall 01/21/2024    Closed fracture of left distal femur 01/20/2024    Closed nondisplaced fracture of greater trochanter of left femur with malunion 01/20/2024        Assessment & Plan:     Closed distal left femur fracture  - s/p ORIF L distal femur with intracondylar split (1/20)  - MMPC  - PT/OT  - Fall precautions   - Per Ortho: NWB LLE, ROMAT. Will need Lovenox x 30 days at discharge. Daily dry dressing changes. Follow up in clinic in 3 weeks for wound recheck and XR.     Acute blood loss anemia  - H&H down  - Transfuse 2 units  - Labs in AM    HTN  - Continue home antihypertensives     Anxiety  - Continue home citalopram     Fall  - MMPC  - Lovenox 40mg q12h for VTE ppx  - Fall and standard precautions   - Hourly IS while awake  - Continue working with PT/OT, would like dispo home with family, likely outpatient PT pending PT recs today

## 2024-01-24 NOTE — PLAN OF CARE
Pt has decided she prefers for home, Justin at [facility] notified. She had talked with pt this am and pt told her. Advised Justin to keep info for a little bit in case pt doesn't do well at home  [facility], she has talked with them and confirmed they would come into her home. She prefers this to home health .Advised she and family will be doing dressing changes. She has the list of home healths to refer to in the event it needed. FOC and referral sent to therapy center and spoke with Bozena there.   Referral sent to [facility] for walker BSC and shower chair. Spoke with Sophia that if insurer will not cover items to call pt and her phone number given. We want deliver to room 1016 today for farheen.  BA.   Pt will have her son and other friends and family staying with her to assist. She asked about the dressing items. Nursing will advise her on this and send some supplies home but they will be responsible to get additional supplies at area drug stores etc.   No further assist.

## 2024-01-24 NOTE — PT/OT/SLP PROGRESS
Physical Therapy Treatment    Patient Name:  Nurys Joe   MRN:  [id number]    Recommendations:     Discharge therapy intensity: Low Intensity Therapy   Discharge Equipment Recommendations: walker, rolling  Barriers to discharge: None    Assessment:     Nurys Joe is a 63 y.o. female admitted with a medical diagnosis of Closed fracture of left distal femur.  She presents with the following impairments/functional limitations: impaired endurance, weakness, impaired self care skills, impaired functional mobility, gait instability, decreased lower extremity function, pain .    Rehab Prognosis: Good; patient would benefit from acute skilled PT services to address these deficits and reach maximum level of function.    Recent Surgery: Procedure(s) (LRB):  INSERTION, INTRAMEDULLARY LAURENT, FEMUR, DISTAL, RETROGRADE (Left) 4 Days Post-Op    Plan:     During this hospitalization, patient to be seen 6 x/week to address the identified rehab impairments via gait training, therapeutic activities, therapeutic exercises and progress toward the following goals:    Plan of Care Expires:  01/20/24    Subjective     Chief Complaint: Pain left knee    Objective:     Communicated with nurse prior to session.  Patient found supine with   upon PT entry to room.     General Precautions: Standard, fall  Orthopedic Precautions: LLE non weight bearing  Braces:    Respiratory Status: Room air  Blood Pressure:   Skin Integrity: Visible skin intact      Functional Mobility:  Independent to get EOB and supervision with tranfers  Gait to bathroom RW NWB and supervision assist. Additional gait 40 35 ft RW SBA  HEP given and Pt performed LE PRE's to increase strenth, ROM, and endurance to improve overall independence.  Pt now has assistance @ home and is ready for D/C once she gets a RW.  ROM 0-75 LLE    Education Provided:  Role and goals of PT, transfer training, bed mobility, gait training, balance training, safety awareness, assistive device,  strengthening exercises, and importance of participating in PT to return to PLOF.     Patient left up in chair with call button in reach..    GOALS:   Multidisciplinary Problems       Physical Therapy Goals          Problem: Physical Therapy    Goal Priority Disciplines Outcome Goal Variances Interventions   Physical Therapy Goal     PT, PT/OT Ongoing, Progressing     Description: Pt will be seen for the following goals  1. Pt will perform bed mobility sba  2. Pt will perform sit to stand with a rw sba  3. Pt will amb NWB LLE with a rw 25ft cga  4. Pt will go up and down 4 steps with right sided rail and AD if needed                       Time Tracking:       Billable Minutes: Gait Training 14 and Therapeutic Exercise 15    Treatment Type: Treatment  PT/PTA: PTA     Number of PTA visits since last PT visit: 3     01/24/2024

## 2024-01-25 ENCOUNTER — PATIENT OUTREACH (OUTPATIENT)
Dept: ADMINISTRATIVE | Facility: CLINIC | Age: 64
End: 2024-01-25
Payer: COMMERCIAL

## 2024-01-25 NOTE — PROGRESS NOTES
C3 nurse spoke with Nurys Joe  for a TCC post hospital discharge follow up call. The patient has a scheduled [facility] [place] appointment with Jp Choi MD  on 01/26/2024 via phone. Appointment with Dr. Kraig Saldivar on 02/05/2024 @ 845 am.

## 2024-01-26 ENCOUNTER — TELEPHONE (OUTPATIENT)
Dept: ORTHOPEDICS | Facility: CLINIC | Age: 64
End: 2024-01-26
Payer: COMMERCIAL

## 2024-01-26 DIAGNOSIS — S72.402A CLOSED FRACTURE OF DISTAL END OF LEFT FEMUR, UNSPECIFIED FRACTURE MORPHOLOGY, INITIAL ENCOUNTER: Primary | ICD-10-CM

## 2024-01-26 RX ORDER — OXYCODONE AND ACETAMINOPHEN 10; 325 MG/1; MG/1
1 TABLET ORAL EVERY 4 HOURS PRN
Qty: 42 TABLET | Refills: 0 | Status: SHIPPED | OUTPATIENT
Start: 2024-01-26 | End: 2024-02-02

## 2024-01-26 NOTE — TELEPHONE ENCOUNTER
Pt called requesting a refill of oxycodone.    Last refill on 1/24/24 by GADIEL Dumont for what looks to be 3 days.    Pt stated she will not make it over the weekend without a refill    Rx not pended.

## 2024-02-05 ENCOUNTER — OFFICE VISIT (OUTPATIENT)
Dept: ORTHOPEDICS | Facility: CLINIC | Age: 64
End: 2024-02-05
Payer: COMMERCIAL

## 2024-02-05 ENCOUNTER — HOSPITAL ENCOUNTER (OUTPATIENT)
Dept: RADIOLOGY | Facility: CLINIC | Age: 64
Discharge: HOME | End: 2024-02-05
Attending: ORTHOPAEDIC SURGERY
Payer: COMMERCIAL

## 2024-02-05 VITALS
SYSTOLIC BLOOD PRESSURE: 118 MMHG | WEIGHT: 145.06 LBS | BODY MASS INDEX: 24.17 KG/M2 | DIASTOLIC BLOOD PRESSURE: 89 MMHG | HEART RATE: 118 BPM | HEIGHT: 65 IN | RESPIRATION RATE: 18 BRPM

## 2024-02-05 DIAGNOSIS — S72.402A CLOSED FRACTURE OF DISTAL END OF LEFT FEMUR, UNSPECIFIED FRACTURE MORPHOLOGY, INITIAL ENCOUNTER: ICD-10-CM

## 2024-02-05 DIAGNOSIS — S72.115P: Primary | ICD-10-CM

## 2024-02-05 DIAGNOSIS — S72.115P: ICD-10-CM

## 2024-02-05 PROCEDURE — 3074F SYST BP LT 130 MM HG: CPT | Mod: CPTII,,, | Performed by: ORTHOPAEDIC SURGERY

## 2024-02-05 PROCEDURE — 99024 POSTOP FOLLOW-UP VISIT: CPT | Mod: ,,, | Performed by: ORTHOPAEDIC SURGERY

## 2024-02-05 PROCEDURE — 3079F DIAST BP 80-89 MM HG: CPT | Mod: CPTII,,, | Performed by: ORTHOPAEDIC SURGERY

## 2024-02-05 PROCEDURE — 73552 X-RAY EXAM OF FEMUR 2/>: CPT | Mod: LT,,, | Performed by: ORTHOPAEDIC SURGERY

## 2024-02-05 PROCEDURE — 1159F MED LIST DOCD IN RCRD: CPT | Mod: CPTII,,, | Performed by: ORTHOPAEDIC SURGERY

## 2024-02-05 RX ORDER — METHOCARBAMOL 500 MG/1
500 TABLET, FILM COATED ORAL 3 TIMES DAILY
Qty: 42 TABLET | Refills: 0 | Status: SHIPPED | OUTPATIENT
Start: 2024-02-05 | End: 2024-02-19

## 2024-02-05 RX ORDER — OXYCODONE AND ACETAMINOPHEN 5; 325 MG/1; MG/1
1 TABLET ORAL EVERY 8 HOURS PRN
Qty: 21 TABLET | Refills: 0 | Status: SHIPPED | OUTPATIENT
Start: 2024-02-05 | End: 2024-02-12

## 2024-02-05 NOTE — PROGRESS NOTES
"Subjective:       Patient ID: Nurys Joe is a 63 y.o. female.  Chief Complaint   Patient presents with    Left Femur - Post-op Evaluation     2 week f/u IMN LEFT DISTAL FEMUR FX, NWB TO LLE, DOING HOME HEALTH PT 3X WEEK.         HPI:  Patient is a 2 week follow-up for left distal femur fracture with condyle fracture open reduction internal fixation.  Patient is talking for by trade.  She tripped and fell.  She is dull achy pain left knee as expected postoperatively.  She has been working on her range of motion which is doing very well.  Working with physical therapy.  Remaining nonweightbearing.  No numbness no tingling.    ROS:  Constitutional: Denies fever chills  Eyes: No change in vision  ENT: No ringing or current infections  CV: No chest pain  Resp: No labored breathing  MSK: Pain evident at site of injury located in HPI,   Integ: No signs of abrasions or lacerations  Neuro: No numbness or tingling  Lymphatic: No swelling outside the area of injury     Current Outpatient Medications on File Prior to Visit   Medication Sig Dispense Refill    amLODIPine (NORVASC) 5 MG tablet Take 5 mg by mouth.      citalopram (CELEXA) 40 MG tablet Take 40 mg by mouth.      conjugated estrogens (PREMARIN) vaginal cream   See Instructions, 0.5gms vaginally q hs x 14 then 2-3x/week, # 30 gm, 6 Refill(s), Pharmacy: [facility]- [place], [place], 168, cm, Height/Length Dosing, 08/18/22 10:16:00 CDT, 65.6, kg, Weight Dosing, 08/18/22 10:15:00 CDT      enoxaparin (LOVENOX) 40 mg/0.4 mL Syrg Inject 0.4 mLs (40 mg total) into the skin Daily. 12 mL 0    losartan (COZAAR) 50 MG tablet Take 50 mg by mouth.      conjugated estrogens (PREMARIN) vaginal cream Place 0.5 g vaginally once daily. (Patient not taking: Reported on 1/25/2024) 1 applicator 5     No current facility-administered medications on file prior to visit.          Objective:      /89   Pulse (!) 118   Resp 18   Ht 5' 5" (1.651 m)   Wt 65.8 kg (145 lb 1 oz)   " "BMI 24.14 kg/m²   General the patient is alert and oriented x3 no acute distress nontoxic-appearing appropriate affect.    Constitutional: Vital signs are examined and stable.  Resp: No signs of labored breathing                 LLE: -Skin: Dressing CDI, No signs of new abrasions or lacerations, no scars           -MSK: EHL/TA/GSC 5/5, ROM 3-90           -Neuro:  Sensation intact to light touch L3-S1 dermatomes           -Lymphatic: No signs of lymphadenopathy           -CV: Capillary refill is less than 2 seconds. DP/PT pulses 2/4. Compartments soft and compressible                        Body mass index is 24.14 kg/m².  Ideal body weight: 57 kg (125 lb 10.6 oz)  Adjusted ideal body weight: 60.5 kg (133 lb 6.8 oz)  No results found for: "HGBA1C"  Hgb   Date Value Ref Range Status   01/24/2024 9.8 (L) 12.0 - 16.0 g/dL Final   01/23/2024 6.9 (L) 12.0 - 16.0 g/dL Final     Vit D 25 OH   Date Value Ref Range Status   01/21/2024 59.8 30.0 - 80.0 ng/mL Final     WBC   Date Value Ref Range Status   01/24/2024 5.71 4.50 - 11.50 x10(3)/mcL Final   01/23/2024 5.90 4.50 - 11.50 x10(3)/mcL Final       Radiology:  Two views left femur skeletally mature individual shows intact hardware.  Condyle fracture appears to be stable no sign of displacement        Assessment:         1. Closed nondisplaced fracture of greater trochanter of left femur with malunion  X-Ray Femur 2 View Left              Plan:         No follow-ups on file.    Nurys was seen today for post-op evaluation.    Diagnoses and all orders for this visit:    Closed nondisplaced fracture of greater trochanter of left femur with malunion  -     X-Ray Femur 2 View Left; Future        Patient has a intra-articular condyle fracture with a distal femur fracture.  This was repaired using headless compression screws and a nail plate device.  Patient is doing very well.  Patient is nonweightbearing range of motion as tolerated.  She has been working with physical therapy " on range of motion of her left knee which she will continue to do daily.  We discussed the risks of posttraumatic arthritis.  Discussed removal of hardware.  We also discussed weight-bearing likely at next visit around 10 weeks.  All questions were answered best my ability.  She would like to step down her pain medication to 5 mg, and refill her muscle relaxer.      This note/OR report was created with the assistance of  voice recognition software or phone  dictation.  There may be transcription errors as a result of using this technology however minimal. Effort has been made to assure accuracy of transcription but any obvious errors or omissions should be clarified with the author of the document.     Kraig Saldivar DO  Orthopedic Trauma Surgery  02/05/2024      No future appointments.

## 2024-02-26 ENCOUNTER — HOSPITAL ENCOUNTER (OUTPATIENT)
Dept: RADIOLOGY | Facility: CLINIC | Age: 64
Discharge: HOME | End: 2024-02-26
Attending: ORTHOPAEDIC SURGERY
Payer: COMMERCIAL

## 2024-02-26 DIAGNOSIS — S72.115P: ICD-10-CM

## 2024-02-27 ENCOUNTER — HOSPITAL ENCOUNTER (OUTPATIENT)
Dept: RADIOLOGY | Facility: CLINIC | Age: 64
Discharge: HOME | End: 2024-02-27
Attending: ORTHOPAEDIC SURGERY
Payer: COMMERCIAL

## 2024-02-27 ENCOUNTER — OFFICE VISIT (OUTPATIENT)
Dept: ORTHOPEDICS | Facility: CLINIC | Age: 64
End: 2024-02-27
Payer: COMMERCIAL

## 2024-02-27 VITALS
BODY MASS INDEX: 24.16 KG/M2 | WEIGHT: 145 LBS | HEART RATE: 94 BPM | SYSTOLIC BLOOD PRESSURE: 124 MMHG | HEIGHT: 65 IN | DIASTOLIC BLOOD PRESSURE: 79 MMHG

## 2024-02-27 DIAGNOSIS — S72.115P: Primary | ICD-10-CM

## 2024-02-27 PROCEDURE — 3078F DIAST BP <80 MM HG: CPT | Mod: CPTII,,, | Performed by: ORTHOPAEDIC SURGERY

## 2024-02-27 PROCEDURE — 1159F MED LIST DOCD IN RCRD: CPT | Mod: CPTII,,, | Performed by: ORTHOPAEDIC SURGERY

## 2024-02-27 PROCEDURE — 3074F SYST BP LT 130 MM HG: CPT | Mod: CPTII,,, | Performed by: ORTHOPAEDIC SURGERY

## 2024-02-27 PROCEDURE — 72100 X-RAY EXAM L-S SPINE 2/3 VWS: CPT | Mod: ,,, | Performed by: ORTHOPAEDIC SURGERY

## 2024-02-27 PROCEDURE — 99024 POSTOP FOLLOW-UP VISIT: CPT | Mod: ,,, | Performed by: ORTHOPAEDIC SURGERY

## 2024-02-29 NOTE — PROGRESS NOTES
Subjective:       Patient ID: Nurys Joe is a 63 y.o. female.  Chief Complaint   Patient presents with    Left Ankle - Numbness     Numbness on bottom of foot, progre worsening, had IMN Left distal femur fx sx 1/20/24, ambulating with walker, states does still having feeling in foot just numbness is concerning her       HPI:  Patient is returning for follow-up for left distal femur fracture with condyle fracture open reduction internal fixation.  Patient has some nonspecific and numbness of the bottom of her foot.  She states she has feeling but it tingles.  This is just recently arose over the last 7 days.  No other symptoms.  No fevers no chills.  She is working aggressively with physical therapy.  ROS:  Constitutional: Denies fever chills  Eyes: No change in vision  ENT: No ringing or current infections  CV: No chest pain  Resp: No labored breathing  MSK: Pain evident at site of injury located in HPI,   Integ: No signs of abrasions or lacerations  Neuro: No numbness or tingling  Lymphatic: No swelling outside the area of injury     Current Outpatient Medications on File Prior to Visit   Medication Sig Dispense Refill    amLODIPine (NORVASC) 5 MG tablet Take 5 mg by mouth.      citalopram (CELEXA) 40 MG tablet Take 40 mg by mouth.      conjugated estrogens (PREMARIN) vaginal cream   See Instructions, 0.5gms vaginally q hs x 14 then 2-3x/week, # 30 gm, 6 Refill(s), Pharmacy: [facility]ending sale to [facility]- [place], 168, cm, Height/Length Dosing, 08/18/22 10:16:00 CDT, 65.6, kg, Weight Dosing, 08/18/22 10:15:00 CDT      losartan (COZAAR) 50 MG tablet Take 50 mg by mouth.      conjugated estrogens (PREMARIN) vaginal cream Place 0.5 g vaginally once daily. (Patient not taking: Reported on 1/25/2024) 1 applicator 5     No current facility-administered medications on file prior to visit.          Objective:      /79 (BP Location: Left arm, Patient Position: Sitting, BP Method: Small (Automatic))   Pulse 94   Ht 5'  "5" (1.651 m)   Wt 65.8 kg (145 lb)   BMI 24.13 kg/m²   General the patient is alert and oriented x3 no acute distress nontoxic-appearing appropriate affect.    Constitutional: Vital signs are examined and stable.  Resp: No signs of labored breathing                 LLE: -Skin: No signs of new abrasions or lacerations, no scars           -MSK: EHL/TA/GSC 5/5, ROM 0-110           -Neuro:  Sensation intact to light touch L3-S1 dermatomes, no signs of numbness           -Lymphatic: No signs of lymphadenopathy           -CV: Capillary refill is less than 2 seconds. DP/PT pulses 2/4. Compartments soft and compressible                        Body mass index is 24.13 kg/m².  Ideal body weight: 57 kg (125 lb 10.6 oz)  Adjusted ideal body weight: 60.5 kg (133 lb 6.4 oz)  No results found for: "HGBA1C"  Hgb   Date Value Ref Range Status   01/24/2024 9.8 (L) 12.0 - 16.0 g/dL Final   01/23/2024 6.9 (L) 12.0 - 16.0 g/dL Final     Vit D 25 OH   Date Value Ref Range Status   01/21/2024 59.8 30.0 - 80.0 ng/mL Final     WBC   Date Value Ref Range Status   01/24/2024 5.71 4.50 - 11.50 x10(3)/mcL Final   01/23/2024 5.90 4.50 - 11.50 x10(3)/mcL Final       Radiology:  Two views left femur skeletally mature individual shows intact hardware.  Signs of healing evident        Assessment:         1. Closed nondisplaced fracture of greater trochanter of left femur with malunion  X-Ray Lumbar Spine 2 Or 3 Views              Plan:         No follow-ups on file.    Nurys was seen today for numbness.    Diagnoses and all orders for this visit:    Closed nondisplaced fracture of greater trochanter of left femur with malunion  -     X-Ray Lumbar Spine 2 Or 3 Views; Future        Patient has a intra-articular condyle fracture with a distal femur fracture.  This was repaired using headless compression screws and a nail plate device.  Patient is doing very well.  She has been working very well with physical therapy.  She is complaining of tingling " in the bottom of her foot.  This may be coming from her back.  We will begin weight-bearing on schedule.  Continue aggressive range of motion.  Overall she is doing very well.       This note/OR report was created with the assistance of  voice recognition software or phone  dictation.  There may be transcription errors as a result of using this technology however minimal. Effort has been made to assure accuracy of transcription but any obvious errors or omissions should be clarified with the author of the document.     Kraig Saldivar DO  Orthopedic Trauma Surgery  02/29/2024      Future Appointments   Date Time Provider Department Center   4/3/2024 10:00 AM Kraig Saldivar DO [facility] [place]

## 2024-04-03 ENCOUNTER — HOSPITAL ENCOUNTER (OUTPATIENT)
Dept: RADIOLOGY | Facility: CLINIC | Age: 64
Discharge: HOME | End: 2024-04-03
Attending: ORTHOPAEDIC SURGERY
Payer: COMMERCIAL

## 2024-04-03 ENCOUNTER — OFFICE VISIT (OUTPATIENT)
Dept: ORTHOPEDICS | Facility: CLINIC | Age: 64
End: 2024-04-03
Payer: COMMERCIAL

## 2024-04-03 VITALS
WEIGHT: 145.06 LBS | DIASTOLIC BLOOD PRESSURE: 80 MMHG | BODY MASS INDEX: 24.17 KG/M2 | SYSTOLIC BLOOD PRESSURE: 127 MMHG | HEIGHT: 65 IN | HEART RATE: 82 BPM

## 2024-04-03 DIAGNOSIS — S72.115P: Primary | ICD-10-CM

## 2024-04-03 DIAGNOSIS — S72.115P: ICD-10-CM

## 2024-04-03 PROCEDURE — 3008F BODY MASS INDEX DOCD: CPT | Mod: CPTII,,, | Performed by: ORTHOPAEDIC SURGERY

## 2024-04-03 PROCEDURE — 1159F MED LIST DOCD IN RCRD: CPT | Mod: CPTII,,, | Performed by: ORTHOPAEDIC SURGERY

## 2024-04-03 PROCEDURE — 99024 POSTOP FOLLOW-UP VISIT: CPT | Mod: ,,, | Performed by: ORTHOPAEDIC SURGERY

## 2024-04-03 PROCEDURE — 73552 X-RAY EXAM OF FEMUR 2/>: CPT | Mod: LT,,, | Performed by: ORTHOPAEDIC SURGERY

## 2024-04-03 PROCEDURE — 3079F DIAST BP 80-89 MM HG: CPT | Mod: CPTII,,, | Performed by: ORTHOPAEDIC SURGERY

## 2024-04-03 PROCEDURE — 3074F SYST BP LT 130 MM HG: CPT | Mod: CPTII,,, | Performed by: ORTHOPAEDIC SURGERY

## 2024-04-03 PROCEDURE — 4010F ACE/ARB THERAPY RXD/TAKEN: CPT | Mod: CPTII,,, | Performed by: ORTHOPAEDIC SURGERY

## 2024-04-03 RX ORDER — MELOXICAM 7.5 MG/1
15 TABLET ORAL DAILY
Qty: 30 TABLET | Refills: 0 | Status: SHIPPED | OUTPATIENT
Start: 2024-04-03

## 2024-04-03 NOTE — PROGRESS NOTES
"  Subjective:       Patient ID: Nurys Joe is a 63 y.o. female.  Chief Complaint   Patient presents with    Follow-up     10 wks f/u, IMN LEFT DISTAL FEMUR FX, sx 1/20/24, denies pain, ambulating with crutches, partial WB, has no other complaints at this time,       HPI:  Patient is returning for follow-up for left distal femur fracture with condyle fracture open reduction internal fixation.  No complaints of numbness or tingling today.  States she has been doing well.  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  ROS:  Constitutional: Denies fever chills  Eyes: No change in vision  ENT: No ringing or current infections  CV: No chest pain  Resp: No labored breathing  MSK: Pain evident at site of injury located in HPI,   Integ: No signs of abrasions or lacerations  Neuro: No numbness or tingling  Lymphatic: No swelling outside the area of injury     Current Outpatient Medications on File Prior to Visit   Medication Sig Dispense Refill    amLODIPine (NORVASC) 5 MG tablet Take 5 mg by mouth.      citalopram (CELEXA) 40 MG tablet Take 40 mg by mouth.      conjugated estrogens (PREMARIN) vaginal cream   See Instructions, 0.5gms vaginally q hs x 14 then 2-3x/week, # 30 gm, 6 Refill(s), Pharmacy: [facility]- [place], 168, cm, Height/Length Dosing, 08/18/22 10:16:00 CDT, 65.6, kg, Weight Dosing, 08/18/22 10:15:00 CDT      losartan (COZAAR) 50 MG tablet Take 50 mg by mouth.      conjugated estrogens (PREMARIN) vaginal cream Place 0.5 g vaginally once daily. (Patient not taking: Reported on 1/25/2024) 1 applicator 5     No current facility-administered medications on file prior to visit.          Objective:      /80 (BP Location: Left arm, Patient Position: Sitting, BP Method: Small (Automatic))   Pulse 82   Ht 5' 5" (1.651 m)   Wt 65.8 kg (145 lb 1 oz)   BMI 24.14 kg/m²   General the patient is alert and oriented x3 no acute distress nontoxic-appearing appropriate affect.    Constitutional: Vital " "signs are examined and stable.  Resp: No signs of labored breathing                 LLE: -Skin: No signs of new abrasions or lacerations, no scars, some mild swelling to the lateral knee likely due to hardware           -MSK: EHL/TA/GSC 5/5, ROM 0-110           -Neuro:  Sensation intact to light touch L3-S1 dermatomes, no signs of numbness           -Lymphatic: No signs of lymphadenopathy           -CV: Capillary refill is less than 2 seconds. DP/PT pulses 2/4. Compartments soft and compressible                        Body mass index is 24.14 kg/m².  Ideal body weight: 57 kg (125 lb 10.6 oz)  Adjusted ideal body weight: 60.5 kg (133 lb 6.8 oz)  No results found for: "HGBA1C"  Hgb   Date Value Ref Range Status   01/24/2024 9.8 (L) 12.0 - 16.0 g/dL Final   01/23/2024 6.9 (L) 12.0 - 16.0 g/dL Final     Vit D 25 OH   Date Value Ref Range Status   01/21/2024 59.8 30.0 - 80.0 ng/mL Final     WBC   Date Value Ref Range Status   01/24/2024 5.71 4.50 - 11.50 x10(3)/mcL Final   01/23/2024 5.90 4.50 - 11.50 x10(3)/mcL Final       Radiology:  Two views left femur skeletally mature individual shows intact hardware.  Signs of healing evident        Assessment:         1. Closed nondisplaced fracture of greater trochanter of left femur with malunion  X-Ray Femur 2 View Left              Plan:         No follow-ups on file.    Nurys was seen today for follow-up.    Diagnoses and all orders for this visit:    Closed nondisplaced fracture of greater trochanter of left femur with malunion  -     X-Ray Femur 2 View Left; Future    Other orders  -     meloxicam (MOBIC) 7.5 MG tablet; Take 2 tablets (15 mg total) by mouth once daily.        Patient has a intra-articular condyle fracture with a distal femur fracture.  This was repaired using headless compression screws and a nail plate device.  Patient is doing very well.  She has been working very well with physical therapy.  Apparently the tingling has resolved.  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  Patient is weight-bearing as tolerated range of motion as tolerated we will give her a new physical therapy script today.  She will follow up in 2-3 months.  She is likely going to go on a full recovery.  We discussed removal of hardware which at this time I would not recommend.       This note/OR report was created with the assistance of  voice recognition software or phone  dictation.  There may be transcription errors as a result of using this technology however minimal. Effort has been made to assure accuracy of transcription but any obvious errors or omissions should be clarified with the author of the document.     Kraig Saldivar DO  Orthopedic Trauma Surgery  04/03/2024      Future Appointments   Date Time Provider Department Center   5/29/2024  9:30 AM [facility] [place]

## 2024-05-29 ENCOUNTER — OFFICE VISIT (OUTPATIENT)
Dept: ORTHOPEDICS | Facility: CLINIC | Age: 64
End: 2024-05-29
Payer: COMMERCIAL

## 2024-05-29 ENCOUNTER — HOSPITAL ENCOUNTER (OUTPATIENT)
Dept: RADIOLOGY | Facility: CLINIC | Age: 64
Discharge: HOME | End: 2024-05-29
Attending: ORTHOPAEDIC SURGERY
Payer: COMMERCIAL

## 2024-05-29 VITALS
BODY MASS INDEX: 24.17 KG/M2 | HEART RATE: 72 BPM | HEIGHT: 65 IN | DIASTOLIC BLOOD PRESSURE: 74 MMHG | WEIGHT: 145.06 LBS | SYSTOLIC BLOOD PRESSURE: 118 MMHG

## 2024-05-29 DIAGNOSIS — S72.115P: ICD-10-CM

## 2024-05-29 DIAGNOSIS — S72.115P: Primary | ICD-10-CM

## 2024-05-29 PROCEDURE — 1159F MED LIST DOCD IN RCRD: CPT | Mod: CPTII,,, | Performed by: ORTHOPAEDIC SURGERY

## 2024-05-29 PROCEDURE — 3078F DIAST BP <80 MM HG: CPT | Mod: CPTII,,, | Performed by: ORTHOPAEDIC SURGERY

## 2024-05-29 PROCEDURE — 99213 OFFICE O/P EST LOW 20 MIN: CPT | Mod: ,,, | Performed by: ORTHOPAEDIC SURGERY

## 2024-05-29 PROCEDURE — 3008F BODY MASS INDEX DOCD: CPT | Mod: CPTII,,, | Performed by: ORTHOPAEDIC SURGERY

## 2024-05-29 PROCEDURE — 4010F ACE/ARB THERAPY RXD/TAKEN: CPT | Mod: CPTII,,, | Performed by: ORTHOPAEDIC SURGERY

## 2024-05-29 PROCEDURE — 3074F SYST BP LT 130 MM HG: CPT | Mod: CPTII,,, | Performed by: ORTHOPAEDIC SURGERY

## 2024-05-29 PROCEDURE — 73552 X-RAY EXAM OF FEMUR 2/>: CPT | Mod: LT,,, | Performed by: ORTHOPAEDIC SURGERY

## 2024-05-29 NOTE — PROGRESS NOTES
"  Subjective:       Patient ID: Nurys Joe is a 63 y.o. female.  Chief Complaint   Patient presents with    Follow-up     4.5 mos, IMN LEFT DISTAL FEMUR FX, sx 1/20/24, still has some pain but manageable, wbat, ambulating without assistance, has no other complaints at this time,        HPI:  Patient is returning for follow-up for left distal femur fracture with condyle fracture open reduction internal fixation.  No complaints of numbness or tingling today.  She is doing very well.  She has some knee pain with kneeling.       ROS:  Constitutional: Denies fever chills  Eyes: No change in vision  ENT: No ringing or current infections  CV: No chest pain  Resp: No labored breathing  MSK: Pain evident at site of injury located in HPI,   Integ: No signs of abrasions or lacerations  Neuro: No numbness or tingling  Lymphatic: No swelling outside the area of injury     Current Outpatient Medications on File Prior to Visit   Medication Sig Dispense Refill    amLODIPine (NORVASC) 5 MG tablet Take 5 mg by mouth.      citalopram (CELEXA) 40 MG tablet Take 40 mg by mouth.      conjugated estrogens (PREMARIN) vaginal cream   See Instructions, 0.5gms vaginally q hs x 14 then 2-3x/week, # 30 gm, 6 Refill(s), [facility] [place], 168, cm, Height/Length Dosing, 08/18/22 10:16:00 CDT, 65.6, kg, Weight Dosing, 08/18/22 10:15:00 CDT      losartan (COZAAR) 50 MG tablet Take 50 mg by mouth.      meloxicam (MOBIC) 7.5 MG tablet Take 2 tablets (15 mg total) by mouth once daily. 30 tablet 0    conjugated estrogens (PREMARIN) vaginal cream Place 0.5 g vaginally once daily. (Patient not taking: Reported on 1/25/2024) 1 applicator 5     No current facility-administered medications on file prior to visit.          Objective:      /74   Pulse 72   Ht 5' 5" (1.651 m)   Wt 65.8 kg (145 lb 1 oz)   BMI 24.14 kg/m²   General the patient is alert and oriented x3 no acute distress nontoxic-appearing appropriate " "affect.    Constitutional: Vital signs are examined and stable.  Resp: No signs of labored breathing                 LLE: -Skin: No signs of new abrasions or lacerations, no scars, some mild swelling to the lateral knee likely due to hardware           -MSK: EHL/TA/GSC 5/5, ROM 0-120           -Neuro:  Sensation intact to light touch L3-S1 dermatomes, no signs of numbness           -Lymphatic: No signs of lymphadenopathy           -CV: Capillary refill is less than 2 seconds. DP/PT pulses 2/4. Compartments soft and compressible                        Body mass index is 24.14 kg/m².  Ideal body weight: 57 kg (125 lb 10.6 oz)  Adjusted ideal body weight: 60.5 kg (133 lb 6.8 oz)  No results found for: "HGBA1C"  Hgb   Date Value Ref Range Status   01/24/2024 9.8 (L) 12.0 - 16.0 g/dL Final   01/23/2024 6.9 (L) 12.0 - 16.0 g/dL Final     Vitamin D   Date Value Ref Range Status   01/21/2024 59.8 30.0 - 80.0 ng/mL Final     WBC   Date Value Ref Range Status   01/24/2024 5.71 4.50 - 11.50 x10(3)/mcL Final   01/23/2024 5.90 4.50 - 11.50 x10(3)/mcL Final       Radiology:  Two views left femur skeletally mature individual shows intact hardware.  Healed fracture        Assessment:         1. Closed nondisplaced fracture of greater trochanter of left femur with malunion  X-Ray Femur 2 View Left              Plan:         No follow-ups on file.    Nurys was seen today for follow-up.    Diagnoses and all orders for this visit:    Closed nondisplaced fracture of greater trochanter of left femur with malunion  -     X-Ray Femur 2 View Left; Future        Patient has a intra-articular condyle fracture with a distal femur fracture.  This was repaired using headless compression screws and a nail plate device.  Patient is doing very well.  She has some pain with kneeling she has patellofemoral arthritis.  The fracture line is not evident on x-rays.  Appears that she has having a good recovery.  Suspect that she will continue to recover " over the next 3-6 months.  We have discussed removal of hardware around 1 year.  We have discussed some of the patellofemoral symptoms being chronic.      This note/OR report was created with the assistance of  voice recognition software or phone  dictation.  There may be transcription errors as a result of using this technology however minimal. Effort has been made to assure accuracy of transcription but any obvious errors or omissions should be clarified with the author of the document.     Kraig Saldivar DO  Orthopedic Trauma Surgery  05/29/2024      No future appointments.

## 2024-09-13 ENCOUNTER — TELEPHONE (OUTPATIENT)
Dept: OBSTETRICS AND GYNECOLOGY | Facility: CLINIC | Age: 64
End: 2024-09-13
Payer: COMMERCIAL

## 2024-09-13 DIAGNOSIS — Z12.31 BREAST CANCER SCREENING BY MAMMOGRAM: Primary | ICD-10-CM

## 2024-09-13 NOTE — TELEPHONE ENCOUNTER
I called the patient to confirm which location she would like the order to be sent to and what date.     She stated that she already has an appt made for 9/26/2024 at the [facility].     I let her know that I will send this order in.     She verbalized a clear understanding.

## 2024-09-13 NOTE — TELEPHONE ENCOUNTER
----- Message from Smitha Donnelly sent at 2024  9:58 AM CDT -----  Regarding: Mammo order  .Type:  Mammogram    Caller is requesting to schedule their annual mammogram appointment.  Order is not listed in EPIC.  Please enter order and contact patient to schedule.  Name of Caller: patient  Where would they like the mammogram performed? [facility] Call Back Number: [phone]  Additional Information:  called requesting mammo order redone- ; appt for mammo set for ; annual scheduled w/SL on

## 2024-09-18 ENCOUNTER — OFFICE VISIT (OUTPATIENT)
Dept: OBSTETRICS AND GYNECOLOGY | Facility: CLINIC | Age: 64
End: 2024-09-18
Payer: COMMERCIAL

## 2024-09-18 VITALS
SYSTOLIC BLOOD PRESSURE: 106 MMHG | DIASTOLIC BLOOD PRESSURE: 72 MMHG | BODY MASS INDEX: 24.76 KG/M2 | WEIGHT: 148.63 LBS | HEIGHT: 65 IN

## 2024-09-18 DIAGNOSIS — Z12.4 SCREENING FOR MALIGNANT NEOPLASM OF THE CERVIX: ICD-10-CM

## 2024-09-18 DIAGNOSIS — Z01.411 ABNORMAL GYNECOLOGICAL EXAMINATION: Primary | ICD-10-CM

## 2024-09-18 DIAGNOSIS — N95.2 ATROPHIC VAGINITIS: ICD-10-CM

## 2024-09-18 PROCEDURE — 1160F RVW MEDS BY RX/DR IN RCRD: CPT | Mod: CPTII,,, | Performed by: NURSE PRACTITIONER

## 2024-09-18 PROCEDURE — 99396 PREV VISIT EST AGE 40-64: CPT | Mod: ,,, | Performed by: NURSE PRACTITIONER

## 2024-09-18 PROCEDURE — 1159F MED LIST DOCD IN RCRD: CPT | Mod: CPTII,,, | Performed by: NURSE PRACTITIONER

## 2024-09-18 PROCEDURE — 3008F BODY MASS INDEX DOCD: CPT | Mod: CPTII,,, | Performed by: NURSE PRACTITIONER

## 2024-09-18 PROCEDURE — 4010F ACE/ARB THERAPY RXD/TAKEN: CPT | Mod: CPTII,,, | Performed by: NURSE PRACTITIONER

## 2024-09-18 PROCEDURE — 3074F SYST BP LT 130 MM HG: CPT | Mod: CPTII,,, | Performed by: NURSE PRACTITIONER

## 2024-09-18 PROCEDURE — 87624 HPV HI-RISK TYP POOLED RSLT: CPT | Performed by: NURSE PRACTITIONER

## 2024-09-18 PROCEDURE — 3078F DIAST BP <80 MM HG: CPT | Mod: CPTII,,, | Performed by: NURSE PRACTITIONER

## 2024-09-18 NOTE — PROGRESS NOTES
Chief Complaint: Annual exam    Chief Complaint   Patient presents with    Well Woman     Patient here for annual GYN exam.        HPI:   63 y.o. F  presents for an annual gyn exam.    , denies vaginal bleeding. Requesting refill of Premarin cream.    Due for MMG.         Labs / Significant Studies:  Gyn History:    Menstrual History  Cycle: No  Menarche Age: 13 years    Menopause  Menopause Age: 39 years  Post Menopausal Bleeding: No  Hormone Replacement Therapy: Yes (vaginal premrin as needed)    Pap History  Last pap date: 21 (NIL)  Result: Normal  History of Abnormal Pap: No  HPV Vaccine Completed: No (0/3)    [place]  Sexually Active: No  STI History: No  Contraception: No    Breast History  Last Breast Imaging Date: Yes  Date: 19 (2024, reports mammogram scheduled for next week)  History of Abnormal Breast Imaging : No  History of Breast Biopsy: No    Family History   Problem Relation Name Age of Onset    Heart failure Father Flavio Lopez     Heart disease Father Flavio Lopez     Stroke Paternal Grandfather Trent lopez     Stroke Paternal Grandmother Shivani Lopez     Hypertension Brother Bobo lopez          Past Medical History:   Diagnosis Date    Anxiety     Hypertension      Past Surgical History:   Procedure Laterality Date    BRAIN SURGERY  2015 tumor    meningioma    COLONOSCOPY  2019    RETROGRADE INTRAMEDULLARY RODDING OF DISTAL FEMUR Left 2024    Procedure: INSERTION, INTRAMEDULLARY LAURENT, FEMUR, DISTAL, RETROGRADE;  Surgeon: Kraig Saldivar DO;  Location: [facility];  Service: Orthopedics;  Laterality: Left;  supine kaylyn traction triangles synthes c arm       Current Outpatient Medications:     amLODIPine (NORVASC) 5 MG tablet, Take 5 mg by mouth., Disp: , Rfl:     citalopram (CELEXA) 40 MG tablet, Take 40 mg by mouth., Disp: , Rfl:     conjugated estrogens (PREMARIN) vaginal cream,  See Instructions, 0.5gms vaginally q hs x 14 then 2-3x/week, # 30 gm, 6  Refill(s), Pharmacy: [facility]- [facility], 168, cm, Height/Length Dosing, 08/18/22 10:16:00 CDT, 65.6, kg, Weight Dosing, 08/18/22 10:15:00 CDT, Disp: , Rfl:     losartan (COZAAR) 50 MG tablet, Take 50 mg by mouth., Disp: , Rfl:     conjugated estrogens (PREMARIN) vaginal cream, 0.5 g per vagina QHS x14 days then 2 times weekly thereafter., Disp: 30 g, Rfl: 6    meloxicam (MOBIC) 7.5 MG tablet, Take 2 tablets (15 mg total) by mouth once daily. (Patient not taking: Reported on 9/18/2024), Disp: 30 tablet, Rfl: 0    Review of patient's allergies indicates:   Allergen Reactions    Sulfamethoxazole      Other reaction(s): unknown reaction       Social History     Tobacco Use    Smoking status: Never    Smokeless tobacco: Never   Substance Use Topics    Alcohol use: Not Currently    Drug use: Never       Review of Systems:  General/Constitutional: Chills denies. Fatigue/weakness denies. Fever denies. Night sweats denies. Hot flashes denies    Respiratory: Cough denies. Hemoptysis denies. SOB denies. Sputum production denies. Wheezing denies .   Cardiovascular: Chest pain denies . Dizziness denies. Palpitations denies. Swelling in hands/feet denies    Gastrointestinal: Abdominal pain denies. Blood in stool denies. Constipation denies. Diarrhea denies. Heartburn denies. Nausea denies. Vomiting denies    Genitourinary: Incontinence denies. Blood in urine denies. Frequent urination denies. Painful urination denies. Urinary urgency denies. Nocturia denies    Gynecologic: Irregular menses denies. Heavy bleeding denies. Painful menses denies. Vaginal discharge denies. Vaginal odor denies. Vaginal itching denies. Vaginal lesion denies. Pelvic pain denies. Decreased libido denies. Vulvar lesion denies. Prolapse of genital organs denies. Painful intercourse denies. Postcoital bleeding denies    Psychiatric: Depression denies. Anxiety denies     Physical Exam:   Vitals:    09/18/24 0811   BP: 106/72   Weight: 67.4 kg (148  "lb 9.6 oz)   Height: 5' 5" (1.651 m)       Body mass index is 24.73 kg/m².       Chaperone: present.     General appearance: healthy, well-nourished and well-developed     Psychiatric: Orientation to time, place and person. Normal mood and affect and active, alert     Skin: Appearance: no rashes or lesions.     Neck:   Neck: supple, FROM, trachea midline. and no masses   Thyroid: no enlargement or nodules and non-tender.       Cardiovascular:   Auscultation: RRR and no murmur.   Peripheral Vascular: no varicosities, LLE edema, RLE edema, calf tenderness, and palpable cord and pedal pulses intact.     Lungs:   Respiratory effort: no intercostal retractions or accessory muscle usage.   Auscultation: no wheezing, rales/crackles, or rhonchi and clear to auscultation.     Breast:   Inspection/Palpation: no tenderness, discrete/distinct masses, skin changes, or abnormal secretions. Nipple appearance normal.     Abdomen:   Auscultation/Inspection/Palpation: no hepatomegaly, splenomegaly, masses, tenderness or CVA tenderness and soft, non-distended bowel sounds preset.    Hernia: no palpable hernias.     Female Genitalia:    Vulva: no masses, tenderness or lesions    Bladder/Urethra: no urethral discharge or mass, normal meatus, bladder non-distended.    Vagina: no tenderness, erythema, cystocele, rectocele, abnormal vaginal discharge or vesicle(s) or ulcers    Cervix: no discharge, no cervical lacerations noted or motion tenderness and grossly normal    Uterus: normal size and shape and midline, non-tender, and no uterine prolapse.    Adnexa/Parametria: no parametrial tenderness or mass, no adnexal tenderness or ovarian mass.     Lymph Nodes:   Palpation: non tender submandibular nodes, axillary nodes, or inguinal nodes.     Rectal Exam:   Rectum: normal perianal skin.       Assessment:     Patient Active Problem List   Diagnosis    Closed fracture of left distal femur    Closed nondisplaced fracture of greater trochanter " of left femur with malunion    HTN (hypertension)    Anxiety    Fall    Acute blood loss anemia       Health Maintenance Due   Topic Date Due    Hepatitis C Screening  Never done    HIV Screening  Never done    TETANUS VACCINE  Never done    Colorectal Cancer Screening  Never done    Shingles Vaccine (1 of 2) Never done    RSV Vaccine (Age 60+ and Pregnant patients) (1 - 1-dose 60+ series) Never done    Mammogram  12/12/2020    Cervical Cancer Screening  08/12/2022    Influenza Vaccine (1) Never done    COVID-19 Vaccine (4 - 2023-24 season) 09/01/2024     Health Maintenance Topics with due status: Not Due       Topic Last Completion Date    Lipid Panel 12/07/2023         Plan:    Nurys was seen today for well woman.    Diagnoses and all orders for this visit:    Abnormal gynecological examination  PAP   Reviewed calcium needs, exercise, and prevention of osteoporosis.  Healthy diet  Annual MMG  Discussed breast self-awareness  Colonoscopy q 10 yrs  Reviewed normal menopause and menopausal symptoms  Strongly encouraged Shingles vaccination  RTC 1 yr   Atrophic vaginitis  Renew Premarin cream   - Educated     - Lubricants, coconut oil, baby oil     -     conjugated estrogens (PREMARIN) vaginal cream; 0.5 g per vagina QHS x14 days then 2 times weekly thereafter.

## 2024-09-26 ENCOUNTER — HOSPITAL ENCOUNTER (OUTPATIENT)
Dept: RADIOLOGY | Facility: HOSPITAL | Age: 64
Discharge: HOME | End: 2024-09-26
Attending: NURSE PRACTITIONER
Payer: COMMERCIAL

## 2024-09-26 DIAGNOSIS — Z12.31 BREAST CANCER SCREENING BY MAMMOGRAM: ICD-10-CM

## 2024-09-26 PROCEDURE — 77063 BREAST TOMOSYNTHESIS BI: CPT | Mod: TC

## 2024-09-26 PROCEDURE — 77067 SCR MAMMO BI INCL CAD: CPT | Mod: TC

## 2024-12-27 NOTE — TELEPHONE ENCOUNTER
Spoke to patient, ok'd with Dr. Saldivar shot taken at 1 pm, will take next dose at 9am tomorrow. 27-Dec-2024 06:49:03

## (undated) DEVICE — BANDAGE ACE NON LATEX 3IN

## (undated) DEVICE — DEVICE CLSR PDS PLUS 1 CT 18IN

## (undated) DEVICE — GLOVE PROTEXIS NEU-THERA SZ6

## (undated) DEVICE — COVER EQUIPMENT 36X25

## (undated) DEVICE — SHEET XLGE DRAPE

## (undated) DEVICE — SPONGE COTTON TRAY 4X4IN

## (undated) DEVICE — BANDAGE ACE DOUBLE STER 6IN

## (undated) DEVICE — STAPLER SKIN PROXIMATE WIDE

## (undated) DEVICE — GOWN SMARTGOWN 3XL XLONG

## (undated) DEVICE — ELECTRODE REM POLYHESIVE II

## (undated) DEVICE — Device

## (undated) DEVICE — SUT CTD VICRYL CT-1 27

## (undated) DEVICE — KIT SURGICAL TURNOVER

## (undated) DEVICE — BIT DRILL CANNULATED 3MM

## (undated) DEVICE — DRESSING PRIMAPORE 4X3 1/8IN

## (undated) DEVICE — DRAPE ORTH SPLIT 77X108IN

## (undated) DEVICE — DRESSING GAUZE XEROFORM 5X9

## (undated) DEVICE — COVER FULLGUARD SHOE HIGH-TOP

## (undated) DEVICE — GLOVE PROTEXIS BLUE LATEX 9

## (undated) DEVICE — SOL NACL IRR 1000ML BTL

## (undated) DEVICE — BIT DRILL 4.2MM 3 FLUTD 145MM

## (undated) DEVICE — APPLICATOR CHLORAPREP ORN 26ML

## (undated) DEVICE — DRAPE C-ARMOR EQUIPMENT COVER

## (undated) DEVICE — BIT DRILL XLN 4.2MM

## (undated) DEVICE — BANDAGE ACE ELASTIC 6"

## (undated) DEVICE — COVER MAYO STAND REINFRCD 30

## (undated) DEVICE — COVER TABLE HVY DTY 60X90IN

## (undated) DEVICE — GLOVE PROTEXIS HYDROGEL SZ9

## (undated) DEVICE — ELECTRODE PATIENT RETURN DISP

## (undated) DEVICE — DRAPE STERI U-SHAPED 47X51IN

## (undated) DEVICE — GLOVE PROTEXIS HYDROGEL SZ6

## (undated) DEVICE — TAPE SILK 3IN

## (undated) DEVICE — PAD ABD 8X10 STERILE